# Patient Record
Sex: FEMALE | Race: WHITE | NOT HISPANIC OR LATINO | Employment: FULL TIME | URBAN - METROPOLITAN AREA
[De-identification: names, ages, dates, MRNs, and addresses within clinical notes are randomized per-mention and may not be internally consistent; named-entity substitution may affect disease eponyms.]

---

## 2020-07-07 ENCOUNTER — OFFICE VISIT (OUTPATIENT)
Dept: INTERNAL MEDICINE | Facility: CLINIC | Age: 35
End: 2020-07-07

## 2020-07-07 VITALS
BODY MASS INDEX: 28.79 KG/M2 | HEIGHT: 65 IN | DIASTOLIC BLOOD PRESSURE: 98 MMHG | TEMPERATURE: 98 F | WEIGHT: 172.8 LBS | SYSTOLIC BLOOD PRESSURE: 138 MMHG

## 2020-07-07 DIAGNOSIS — R03.0 ELEVATED BLOOD PRESSURE READING: ICD-10-CM

## 2020-07-07 DIAGNOSIS — Z00.00 WELLNESS EXAMINATION: Primary | ICD-10-CM

## 2020-07-07 DIAGNOSIS — G44.89 OTHER HEADACHE SYNDROME: ICD-10-CM

## 2020-07-07 DIAGNOSIS — Z11.59 NEED FOR HEPATITIS C SCREENING TEST: ICD-10-CM

## 2020-07-07 PROCEDURE — 99385 PREV VISIT NEW AGE 18-39: CPT | Performed by: INTERNAL MEDICINE

## 2020-07-07 NOTE — PROGRESS NOTES
Subjective        Chief Complaint   Patient presents with   • Annual Exam     est care           Adriane Schwartz is a 35 y.o. female who presents for    Patient Active Problem List   Diagnosis   • Wellness examination   • Other headache syndrome   • Elevated blood pressure reading       History of Present Illness     She is here to establish. She has occasional anxiety; she does not feel like she needs medications. She denies SI. Her headaches happen 1-2 times per month. They are usually frontal. She is not sure of the trigger. The pain can be worse with lights or if her dog barks. She uses an ice pack and it helps. She has tried ibuprofen and tylenol. She denies slurred speech, blindness or weakness. They do not wake her from sleep. They are not associated with her menstrual cycle. She has had headaches since college. She can have nausea with them. Her BP was elevated at the dentist about a month ago. She does not check it at home. She has not had any issues when she donates blood at the [employer]. She does not routinely add salt to her food. Her sister was started on BP meds in her early 30s. She does not exercise.  No Known Allergies    No current outpatient medications on file prior to visit.     No current facility-administered medications on file prior to visit.        Past Medical History:   Diagnosis Date   • Acid reflux    • Migraine headache        Past Surgical History:   Procedure Laterality Date   • FRACTURE SURGERY Left     Elbow       Family History   Problem Relation Age of Onset   • Hypertension Mother    • Cancer Father         sarcoma in neck   • Hypertension Sister    • Migraines Sister    • Heart attack Paternal Uncle    • Heart attack Maternal Grandfather        Social History     Socioeconomic History   • Marital status:      Spouse name: Not on file   • Number of children: Not on file   • Years of education: Not on file   • Highest education level: Not on file   Tobacco Use   • Smoking  "status: Never Smoker   • Smokeless tobacco: Never Used   Substance and Sexual Activity   • Alcohol use: Yes     Frequency: 4 or more times a week     Drinks per session: 1 or 2     Comment: 3 beers on weekend. glass of wine each weekend night           The following portions of the patient's history were reviewed and updated as appropriate: problem list, allergies, current medications, past medical history, past family history, past social history and past surgical history.    Review of Systems   Constitutional: Negative for chills, fever and unexpected weight loss.   HENT: Negative for postnasal drip and sore throat.    Eyes: Negative for blurred vision.   Respiratory: Negative for cough, shortness of breath and wheezing.    Cardiovascular: Negative for chest pain and leg swelling.   Gastrointestinal: Negative for abdominal pain, blood in stool, nausea, vomiting and GERD.   Endocrine: Negative for polyuria.   Genitourinary: Negative for dysuria, frequency and hematuria.   Musculoskeletal: Negative for gait problem.   Skin: Negative for rash.   Allergic/Immunologic: Negative for immunocompromised state.   Neurological: Positive for headache. Negative for weakness.   Hematological: Does not bruise/bleed easily.   Psychiatric/Behavioral: Negative for depressed mood. The patient is nervous/anxious.        There is no immunization history for the selected administration types on file for this patient.    Objective   Vitals:    07/07/20 0945   BP: 138/98   Temp: 98 °F (36.7 °C)   Weight: 78.4 kg (172 lb 12.8 oz)   Height: 165.1 cm (65\")     Body mass index is 28.76 kg/m².  Physical Exam   Constitutional: She appears well-developed and well-nourished.   HENT:   Head: Normocephalic and atraumatic.   Mouth/Throat: Oropharynx is clear and moist.   Eyes: Pupils are equal, round, and reactive to light. Conjunctivae and EOM are normal.   Neck: Neck supple. No thyromegaly present.   Cardiovascular: Normal rate, regular rhythm " and normal heart sounds.   No murmur heard.  Pulmonary/Chest: Effort normal and breath sounds normal.   Abdominal: Soft. She exhibits no distension and no mass. There is no tenderness. There is no rebound.   Lymphadenopathy:     She has no cervical adenopathy.   Neurological: She is alert.   Reflex Scores:       Bicep reflexes are 1+ on the right side and 1+ on the left side.       Brachioradialis reflexes are 1+ on the right side and 1+ on the left side.       Patellar reflexes are 3+ on the right side and 3+ on the left side.       Achilles reflexes are 2+ on the right side and 2+ on the left side.  CN II-XII intact except did not check VIII    5/5 strength    Skin: Skin is warm.   Psychiatric: She has a normal mood and affect.   Vitals reviewed.      Procedures    Assessment/Plan   Adriane was seen today for annual exam.    Diagnoses and all orders for this visit:    Wellness examination  -     Hepatitis C Antibody  -     Comprehensive Metabolic Panel    Other headache syndrome    Need for hepatitis C screening test  -     Lipid Panel With / Chol / HDL Ratio    Elevated blood pressure reading    Other orders  -     Cancel: Tdap Vaccine Greater Than or Equal To 6yo IM             Tdap in 6 weeks as we are out of them today.      She reports pap smear in [place] last December. Her  is sterile. Discussed exercise 150 minutes per week. Discussed avoiding salt. Decrease beer on the weekend. Encouraged to buy Omron BP monitor and check few times per week. I want to see if BP elevation correlates with headaches. If her BP is up next time, I would get an EKG and treat. If BP is normal at home, we could consider trial of Imitrex.  Return in about 6 weeks (around 8/18/2020).

## 2020-07-08 LAB
ALBUMIN SERPL-MCNC: 4.4 G/DL (ref 3.5–5.2)
ALBUMIN/GLOB SERPL: 1.8 G/DL
ALP SERPL-CCNC: 80 U/L (ref 39–117)
ALT SERPL-CCNC: 18 U/L (ref 1–33)
AST SERPL-CCNC: 16 U/L (ref 1–32)
BILIRUB SERPL-MCNC: 0.4 MG/DL (ref 0–1.2)
BUN SERPL-MCNC: 11 MG/DL (ref 6–20)
BUN/CREAT SERPL: 14.3 (ref 7–25)
CALCIUM SERPL-MCNC: 9.2 MG/DL (ref 8.6–10.5)
CHLORIDE SERPL-SCNC: 103 MMOL/L (ref 98–107)
CHOLEST SERPL-MCNC: 187 MG/DL (ref 0–200)
CHOLEST/HDLC SERPL: 2.92 {RATIO}
CO2 SERPL-SCNC: 25.4 MMOL/L (ref 22–29)
CREAT SERPL-MCNC: 0.77 MG/DL (ref 0.57–1)
GLOBULIN SER CALC-MCNC: 2.4 GM/DL
GLUCOSE SERPL-MCNC: 87 MG/DL (ref 65–99)
HCV AB S/CO SERPL IA: <0.1 S/CO RATIO (ref 0–0.9)
HDLC SERPL-MCNC: 64 MG/DL (ref 40–60)
LDLC SERPL CALC-MCNC: 105 MG/DL (ref 0–100)
POTASSIUM SERPL-SCNC: 4.3 MMOL/L (ref 3.5–5.2)
PROT SERPL-MCNC: 6.8 G/DL (ref 6–8.5)
SODIUM SERPL-SCNC: 137 MMOL/L (ref 136–145)
TRIGL SERPL-MCNC: 88 MG/DL (ref 0–150)
VLDLC SERPL CALC-MCNC: 17.6 MG/DL

## 2020-07-09 ENCOUNTER — TELEPHONE (OUTPATIENT)
Dept: INTERNAL MEDICINE | Facility: CLINIC | Age: 35
End: 2020-07-09

## 2020-08-20 ENCOUNTER — OFFICE VISIT (OUTPATIENT)
Dept: INTERNAL MEDICINE | Facility: CLINIC | Age: 35
End: 2020-08-20

## 2020-08-20 VITALS
HEIGHT: 65 IN | BODY MASS INDEX: 27.99 KG/M2 | WEIGHT: 168 LBS | TEMPERATURE: 97.5 F | SYSTOLIC BLOOD PRESSURE: 136 MMHG | DIASTOLIC BLOOD PRESSURE: 98 MMHG

## 2020-08-20 DIAGNOSIS — I10 ESSENTIAL HYPERTENSION: Primary | ICD-10-CM

## 2020-08-20 PROCEDURE — 90471 IMMUNIZATION ADMIN: CPT | Performed by: INTERNAL MEDICINE

## 2020-08-20 PROCEDURE — 99213 OFFICE O/P EST LOW 20 MIN: CPT | Performed by: INTERNAL MEDICINE

## 2020-08-20 PROCEDURE — 93000 ELECTROCARDIOGRAM COMPLETE: CPT | Performed by: INTERNAL MEDICINE

## 2020-08-20 PROCEDURE — 90715 TDAP VACCINE 7 YRS/> IM: CPT | Performed by: INTERNAL MEDICINE

## 2020-08-20 RX ORDER — AMLODIPINE BESYLATE 5 MG/1
5 TABLET ORAL DAILY
Qty: 30 TABLET | Refills: 2 | Status: SHIPPED | OUTPATIENT
Start: 2020-08-20 | End: 2020-09-21

## 2020-08-20 NOTE — PROGRESS NOTES
Subjective        Chief Complaint   Patient presents with   • Hypertension           Adriane Schwartz is a 35 y.o. female who presents for    Patient Active Problem List   Diagnosis   • Other headache syndrome   • Essential hypertension       History of Present Illness     Her BP has been 129-143/. She denies chest pain or dyspnea. She does not exercise. She can feel a fast heart rate after drinking caffeine. She has not had a migraine since I saw her.  No Known Allergies    No current outpatient medications on file prior to visit.     No current facility-administered medications on file prior to visit.        Past Medical History:   Diagnosis Date   • Acid reflux    • Migraine headache        Past Surgical History:   Procedure Laterality Date   • FRACTURE SURGERY Left     Elbow       Family History   Problem Relation Age of Onset   • Hypertension Mother    • Cancer Father         sarcoma in neck   • Hypertension Sister    • Migraines Sister    • Heart attack Paternal Uncle    • Heart attack Maternal Grandfather        Social History     Socioeconomic History   • Marital status:      Spouse name: Not on file   • Number of children: Not on file   • Years of education: Not on file   • Highest education level: Not on file   Tobacco Use   • Smoking status: Never Smoker   • Smokeless tobacco: Never Used   Substance and Sexual Activity   • Alcohol use: Yes     Frequency: 4 or more times a week     Drinks per session: 1 or 2     Comment: 3 beers on weekend. glass of wine each weekend night           The following portions of the patient's history were reviewed and updated as appropriate: problem list, allergies, current medications, past medical history, past family history, past social history and past surgical history.    Review of Systems   Respiratory: Negative for shortness of breath.    Cardiovascular: Negative for chest pain.       Immunization History   Administered Date(s) Administered   • Tdap 08/20/2020  "      Objective   Vitals:    08/20/20 1504   BP: 136/98   Temp: 97.5 °F (36.4 °C)   Weight: 76.2 kg (168 lb)   Height: 165.1 cm (65\")     Body mass index is 27.96 kg/m².  Physical Exam   Constitutional: She appears well-developed and well-nourished.   HENT:   Head: Normocephalic and atraumatic.   Cardiovascular: Normal rate, regular rhythm, S1 normal, S2 normal and normal heart sounds.   Pulmonary/Chest: Effort normal and breath sounds normal.   Neurological: She is alert.   Skin: Skin is warm.   Psychiatric: She has a normal mood and affect.   Vitals reviewed.        ECG 12 Lead  Date/Time: 8/20/2020 3:47 PM  Performed by: Harrison Lew MD  Authorized by: Harrison Lew MD   Comparison: not compared with previous ECG   Rhythm: sinus rhythm  Rate: normal  T elevation: V1  QRS axis: normal    Clinical impression: normal ECG            Assessment/Plan   Adriane was seen today for hypertension.    Diagnoses and all orders for this visit:    Essential hypertension  -     amLODIPine (NORVASC) 5 MG tablet; Take 1 tablet by mouth Daily.  -     ECG 12 Lead    Other orders  -     Tdap Vaccine Greater Than or Equal To 8yo IM             EKG is nl. Tdap today. Start Norvasc for HTN. Discussed exercise. Reviewed labs.    Return in about 4 weeks (around 9/17/2020).  "

## 2020-09-21 ENCOUNTER — OFFICE VISIT (OUTPATIENT)
Dept: INTERNAL MEDICINE | Facility: CLINIC | Age: 35
End: 2020-09-21

## 2020-09-21 VITALS
TEMPERATURE: 98.4 F | DIASTOLIC BLOOD PRESSURE: 90 MMHG | BODY MASS INDEX: 28.19 KG/M2 | WEIGHT: 169.2 LBS | HEIGHT: 65 IN | SYSTOLIC BLOOD PRESSURE: 120 MMHG

## 2020-09-21 DIAGNOSIS — I10 ESSENTIAL HYPERTENSION: Primary | ICD-10-CM

## 2020-09-21 PROCEDURE — 90471 IMMUNIZATION ADMIN: CPT | Performed by: INTERNAL MEDICINE

## 2020-09-21 PROCEDURE — 90686 IIV4 VACC NO PRSV 0.5 ML IM: CPT | Performed by: INTERNAL MEDICINE

## 2020-09-21 PROCEDURE — 99213 OFFICE O/P EST LOW 20 MIN: CPT | Performed by: INTERNAL MEDICINE

## 2020-09-21 RX ORDER — AMLODIPINE BESYLATE 10 MG/1
10 TABLET ORAL DAILY
Qty: 30 TABLET | Refills: 2 | Status: SHIPPED | OUTPATIENT
Start: 2020-09-21 | End: 2020-11-03 | Stop reason: SDUPTHER

## 2020-09-21 NOTE — PROGRESS NOTES
Subjective        Chief Complaint   Patient presents with   • Hypertension           Adriane Schwartz is a 35 y.o. female who presents for    Patient Active Problem List   Diagnosis   • Other headache syndrome   • Essential hypertension       History of Present Illness     She thinks her BP is coming down but she did not bring in those numbers today. She thinks it was 120/90 earlier today. She denies chest pain or dyspnea. She has not had any headaches recently.  No Known Allergies    Current Outpatient Medications on File Prior to Visit   Medication Sig Dispense Refill   • [DISCONTINUED] amLODIPine (NORVASC) 5 MG tablet Take 1 tablet by mouth Daily. 30 tablet 2     No current facility-administered medications on file prior to visit.        Past Medical History:   Diagnosis Date   • Acid reflux    • Migraine headache        Past Surgical History:   Procedure Laterality Date   • FRACTURE SURGERY Left     Elbow       Family History   Problem Relation Age of Onset   • Hypertension Mother    • Cancer Father         sarcoma in neck   • Hypertension Sister    • Migraines Sister    • Heart attack Paternal Uncle    • Heart attack Maternal Grandfather        Social History     Socioeconomic History   • Marital status:      Spouse name: Not on file   • Number of children: Not on file   • Years of education: Not on file   • Highest education level: Not on file   Tobacco Use   • Smoking status: Never Smoker   • Smokeless tobacco: Never Used   Substance and Sexual Activity   • Alcohol use: Yes     Frequency: 4 or more times a week     Drinks per session: 1 or 2     Comment: 3 beers on weekend. glass of wine each weekend night           The following portions of the patient's history were reviewed and updated as appropriate: problem list, allergies, current medications, past medical history, past family history, past social history and past surgical history.    Review of Systems   Respiratory: Negative for shortness of breath.  "   Cardiovascular: Negative for chest pain.       Immunization History   Administered Date(s) Administered   • Flulaval/Fluarix Quad 09/21/2020   • Tdap 08/20/2020       Objective   Vitals:    09/21/20 1433   BP: 120/90   Temp: 98.4 °F (36.9 °C)   Weight: 76.7 kg (169 lb 3.2 oz)   Height: 165.1 cm (65\")     Body mass index is 28.16 kg/m².  Physical Exam  Vitals signs reviewed.   Constitutional:       Appearance: She is well-developed.   HENT:      Head: Normocephalic and atraumatic.   Cardiovascular:      Rate and Rhythm: Normal rate and regular rhythm.      Heart sounds: Normal heart sounds, S1 normal and S2 normal.   Pulmonary:      Effort: Pulmonary effort is normal.      Breath sounds: Normal breath sounds.   Skin:     General: Skin is warm.   Neurological:      Mental Status: She is alert.   Psychiatric:         Behavior: Behavior normal.         Procedures    Assessment/Plan   Adriane was seen today for hypertension.    Diagnoses and all orders for this visit:    Essential hypertension  -     amLODIPine (NORVASC) 10 MG tablet; Take 1 tablet by mouth Daily.    Other orders  -     Fluarix/Fluzone/Afluria Quad>6 Months               Flu shot today. Increase Norvasc to 10 mg daily since DBP still up at home as well. Monitor for dizziness and edema.  Return in about 5 weeks (around 10/26/2020).  "

## 2020-11-03 ENCOUNTER — OFFICE VISIT (OUTPATIENT)
Dept: INTERNAL MEDICINE | Facility: CLINIC | Age: 35
End: 2020-11-03

## 2020-11-03 VITALS
TEMPERATURE: 97.5 F | SYSTOLIC BLOOD PRESSURE: 116 MMHG | HEIGHT: 65 IN | WEIGHT: 163 LBS | DIASTOLIC BLOOD PRESSURE: 82 MMHG | BODY MASS INDEX: 27.16 KG/M2

## 2020-11-03 DIAGNOSIS — R63.0 DECREASED APPETITE: Primary | ICD-10-CM

## 2020-11-03 DIAGNOSIS — I10 ESSENTIAL HYPERTENSION: ICD-10-CM

## 2020-11-03 PROCEDURE — 99213 OFFICE O/P EST LOW 20 MIN: CPT | Performed by: INTERNAL MEDICINE

## 2020-11-03 RX ORDER — AMLODIPINE BESYLATE 10 MG/1
10 TABLET ORAL DAILY
Qty: 90 TABLET | Refills: 1 | Status: SHIPPED | OUTPATIENT
Start: 2020-11-03 | End: 2021-05-17 | Stop reason: SDUPTHER

## 2020-11-03 NOTE — PROGRESS NOTES
Subjective        Chief Complaint   Patient presents with   • Hypertension           Adriane Schwartz is a 35 y.o. female who presents for    Patient Active Problem List   Diagnosis   • Other headache syndrome   • Essential hypertension   • Decreased appetite       History of Present Illness     Her BP has been 120/80. She denies dyspnea, chest pain or headaches. She is not exercising. She thinks her appetite is not as much with Norvasc. She denies nausea, vomiting, abdominal pain or melena.   No Known Allergies    Current Outpatient Medications on File Prior to Visit   Medication Sig Dispense Refill   • [DISCONTINUED] amLODIPine (NORVASC) 10 MG tablet Take 1 tablet by mouth Daily. 30 tablet 2     No current facility-administered medications on file prior to visit.        Past Medical History:   Diagnosis Date   • Acid reflux    • Migraine headache        Past Surgical History:   Procedure Laterality Date   • FRACTURE SURGERY Left     Elbow       Family History   Problem Relation Age of Onset   • Hypertension Mother    • Cancer Father         sarcoma in neck   • Hypertension Sister    • Migraines Sister    • Heart attack Paternal Uncle    • Heart attack Maternal Grandfather        Social History     Socioeconomic History   • Marital status:      Spouse name: Not on file   • Number of children: Not on file   • Years of education: Not on file   • Highest education level: Not on file   Tobacco Use   • Smoking status: Never Smoker   • Smokeless tobacco: Never Used   Substance and Sexual Activity   • Alcohol use: Yes     Frequency: 4 or more times a week     Drinks per session: 1 or 2     Comment: 3 beers on weekend. glass of wine each weekend night           The following portions of the patient's history were reviewed and updated as appropriate: problem list, allergies, current medications, past medical history, past family history, past social history and past surgical history.    Review of Systems   Respiratory:  "Negative for shortness of breath.    Cardiovascular: Negative for chest pain.   Neurological: Negative for headache.       Immunization History   Administered Date(s) Administered   • Flulaval/Fluarix/Fluzone Quad 09/21/2020   • Tdap 08/20/2020       Objective   Vitals:    11/03/20 1648   BP: 116/82   Temp: 97.5 °F (36.4 °C)   Weight: 73.9 kg (163 lb)   Height: 165.1 cm (65\")     Body mass index is 27.12 kg/m².  Physical Exam  Vitals signs reviewed.   Constitutional:       Appearance: She is well-developed.   HENT:      Head: Normocephalic and atraumatic.   Cardiovascular:      Rate and Rhythm: Normal rate and regular rhythm.      Heart sounds: Normal heart sounds, S1 normal and S2 normal.   Pulmonary:      Effort: Pulmonary effort is normal.      Breath sounds: Normal breath sounds.   Abdominal:      Palpations: Abdomen is soft. There is no mass.      Tenderness: There is no abdominal tenderness.   Skin:     General: Skin is warm.   Neurological:      Mental Status: She is alert.   Psychiatric:         Behavior: Behavior normal.         Procedures    Assessment/Plan   Diagnoses and all orders for this visit:    1. Decreased appetite (Primary)    2. Essential hypertension  -     amLODIPine (NORVASC) 10 MG tablet; Take 1 tablet by mouth Daily.  Dispense: 90 tablet; Refill: 1             BP is excellent. Her appetite has decreased some.  We discussed not likely related to Norvasc. Offered to do more testing. She will come back in if she were to develop any other symptoms. I think that is reasonable given that she feels good otherwise.    Return in about 6 months (around 5/3/2021).  "

## 2021-04-16 ENCOUNTER — BULK ORDERING (OUTPATIENT)
Dept: CASE MANAGEMENT | Facility: OTHER | Age: 36
End: 2021-04-16

## 2021-04-16 DIAGNOSIS — Z23 IMMUNIZATION DUE: ICD-10-CM

## 2021-05-13 ENCOUNTER — OFFICE VISIT (OUTPATIENT)
Dept: INTERNAL MEDICINE | Facility: CLINIC | Age: 36
End: 2021-05-13

## 2021-05-13 VITALS
WEIGHT: 146 LBS | SYSTOLIC BLOOD PRESSURE: 118 MMHG | BODY MASS INDEX: 24.32 KG/M2 | DIASTOLIC BLOOD PRESSURE: 88 MMHG | TEMPERATURE: 97.6 F | HEIGHT: 65 IN

## 2021-05-13 DIAGNOSIS — R63.4 WEIGHT LOSS: ICD-10-CM

## 2021-05-13 DIAGNOSIS — I10 ESSENTIAL HYPERTENSION: Primary | ICD-10-CM

## 2021-05-13 PROCEDURE — 99214 OFFICE O/P EST MOD 30 MIN: CPT | Performed by: INTERNAL MEDICINE

## 2021-05-13 NOTE — PROGRESS NOTES
Subjective        Chief Complaint   Patient presents with   • Hypertension     f/u           Adriane Schwartz is a 36 y.o. female who presents for    Patient Active Problem List   Diagnosis   • Other headache syndrome   • Essential hypertension   • Decreased appetite       History of Present Illness     She has not checked her BP recently. She denies chest pain or dyspnea. She has lost 17 pounds. She says her weight has been stable for the last  3 months. She is not exercising. She does not seem to be as hungry so she does not over eat. She denies depression, melena, hematochezia, cough, lymphadenopathy, abnormal moles, nausea, vomiting, or abdominal pain. She thinks she does not go out to eat as often. She does not feel hungry at home. She thinks she may have been missing lunch more since she has worked from home.  No Known Allergies    Current Outpatient Medications on File Prior to Visit   Medication Sig Dispense Refill   • amLODIPine (NORVASC) 10 MG tablet Take 1 tablet by mouth Daily. 90 tablet 1     No current facility-administered medications on file prior to visit.       Past Medical History:   Diagnosis Date   • Acid reflux    • Migraine headache        Past Surgical History:   Procedure Laterality Date   • FRACTURE SURGERY Left     Elbow       Family History   Problem Relation Age of Onset   • Hypertension Mother    • Cancer Father         sarcoma in neck   • Hypertension Sister    • Migraines Sister    • Heart attack Paternal Uncle    • Heart attack Maternal Grandfather        Social History     Socioeconomic History   • Marital status:      Spouse name: Not on file   • Number of children: Not on file   • Years of education: Not on file   • Highest education level: Not on file   Tobacco Use   • Smoking status: Never Smoker   • Smokeless tobacco: Never Used   Substance and Sexual Activity   • Alcohol use: Yes     Comment: 3 beers on weekend. glass of wine each weekend night           The following  "portions of the patient's history were reviewed and updated as appropriate: problem list, allergies, current medications, past medical history, past family history, past social history and past surgical history.    Review of Systems    Immunization History   Administered Date(s) Administered   • COVID-19 (PFIZER) 03/11/2021, 04/03/2021   • Flulaval/Fluarix/Fluzone Quad 09/21/2020   • Tdap 08/20/2020       Objective   Vitals:    05/13/21 1646   BP: 118/88   Temp: 97.6 °F (36.4 °C)   Weight: 66.2 kg (146 lb)   Height: 165.1 cm (65\")     Body mass index is 24.3 kg/m².  Physical Exam  Vitals reviewed.   Constitutional:       Appearance: She is well-developed.   HENT:      Head: Normocephalic and atraumatic.      Mouth/Throat:      Mouth: Mucous membranes are moist.      Pharynx: Oropharynx is clear.   Cardiovascular:      Rate and Rhythm: Normal rate and regular rhythm.      Heart sounds: Normal heart sounds, S1 normal and S2 normal.   Pulmonary:      Effort: Pulmonary effort is normal.      Breath sounds: Normal breath sounds.   Abdominal:      Palpations: Abdomen is soft. There is no mass.      Tenderness: There is no abdominal tenderness.   Lymphadenopathy:      Cervical: No cervical adenopathy.      Upper Body:      Right upper body: No supraclavicular, axillary or epitrochlear adenopathy.      Left upper body: No supraclavicular, axillary or epitrochlear adenopathy.   Skin:     General: Skin is warm.   Neurological:      Mental Status: She is alert.   Psychiatric:         Behavior: Behavior normal.         Procedures    Assessment/Plan   Diagnoses and all orders for this visit:    1. Essential hypertension (Primary)  -     Comprehensive Metabolic Panel; Future  -     Lipid Panel With / Chol / HDL Ratio; Future    2. Weight loss  -     CBC & Differential; Future  -     TSH; Future  -     Comprehensive Metabolic Panel; Future  -     Urinalysis With Culture If Indicated -; Future               BP is okay. She has " stress some today with putting house on the market. It sounds like weight loss may be related to change in routine with working from home. She says it has been stable the last 3 months and she does not feel bad. To be complete, we will get some blood work in the next week. Her cancer screening is UTD with a negative pap smear in the last 18-24 months.  Return in about 10 weeks (around 7/22/2021), or labs end next week as well, for [facility] Before [facility], Annual physical.

## 2021-05-17 DIAGNOSIS — I10 ESSENTIAL HYPERTENSION: ICD-10-CM

## 2021-05-17 RX ORDER — AMLODIPINE BESYLATE 10 MG/1
10 TABLET ORAL DAILY
Qty: 90 TABLET | Refills: 1 | Status: SHIPPED | OUTPATIENT
Start: 2021-05-17 | End: 2021-09-21 | Stop reason: SDUPTHER

## 2021-05-31 DIAGNOSIS — R63.4 WEIGHT LOSS: Primary | ICD-10-CM

## 2021-06-01 ENCOUNTER — TELEPHONE (OUTPATIENT)
Dept: INTERNAL MEDICINE | Facility: CLINIC | Age: 36
End: 2021-06-01

## 2021-09-21 ENCOUNTER — OFFICE VISIT (OUTPATIENT)
Dept: INTERNAL MEDICINE | Facility: CLINIC | Age: 36
End: 2021-09-21

## 2021-09-21 VITALS
TEMPERATURE: 97.8 F | HEIGHT: 65 IN | SYSTOLIC BLOOD PRESSURE: 112 MMHG | DIASTOLIC BLOOD PRESSURE: 82 MMHG | BODY MASS INDEX: 23.82 KG/M2 | WEIGHT: 143 LBS

## 2021-09-21 DIAGNOSIS — F51.01 PRIMARY INSOMNIA: ICD-10-CM

## 2021-09-21 DIAGNOSIS — Z00.00 WELLNESS EXAMINATION: ICD-10-CM

## 2021-09-21 DIAGNOSIS — Z23 NEEDS FLU SHOT: Primary | ICD-10-CM

## 2021-09-21 DIAGNOSIS — F41.9 ANXIETY: ICD-10-CM

## 2021-09-21 DIAGNOSIS — I10 ESSENTIAL HYPERTENSION: ICD-10-CM

## 2021-09-21 PROCEDURE — 90471 IMMUNIZATION ADMIN: CPT | Performed by: INTERNAL MEDICINE

## 2021-09-21 PROCEDURE — 99395 PREV VISIT EST AGE 18-39: CPT | Performed by: INTERNAL MEDICINE

## 2021-09-21 PROCEDURE — 90686 IIV4 VACC NO PRSV 0.5 ML IM: CPT | Performed by: INTERNAL MEDICINE

## 2021-09-21 RX ORDER — TRAZODONE HYDROCHLORIDE 50 MG/1
50 TABLET ORAL NIGHTLY
Qty: 30 TABLET | Refills: 3 | Status: SHIPPED | OUTPATIENT
Start: 2021-09-21

## 2021-09-21 RX ORDER — AMLODIPINE BESYLATE 10 MG/1
10 TABLET ORAL DAILY
Qty: 90 TABLET | Refills: 1 | Status: SHIPPED | OUTPATIENT
Start: 2021-09-21 | End: 2022-06-14 | Stop reason: SDUPTHER

## 2021-09-21 NOTE — PROGRESS NOTES
Subjective        Chief Complaint   Patient presents with   • Annual Exam           Adriane Schwartz is a 36 y.o. female who presents for    Patient Active Problem List   Diagnosis   • Other headache syndrome   • Essential hypertension   • Decreased appetite       History of Present Illness     She has not been checking her BP. Her anxiety has been getting worse over the last 2 months. She is not as focused. She has not been sleeping well. It is hard to go to sleep and stay asleep. She has never had anxiety before. Things are fine at work and home. Her  was told that he is sterile.  Her appetite fluctuates. Her weight has been stable at home. She denies depression. She has tried melatonin for sleep and it tenses her legs sometimes.  No Known Allergies    Current Outpatient Medications on File Prior to Visit   Medication Sig Dispense Refill   • [DISCONTINUED] amLODIPine (NORVASC) 10 MG tablet Take 1 tablet by mouth Daily. 90 tablet 1     No current facility-administered medications on file prior to visit.       Past Medical History:   Diagnosis Date   • Acid reflux    • Migraine headache        Past Surgical History:   Procedure Laterality Date   • FRACTURE SURGERY Left     Elbow       Family History   Problem Relation Age of Onset   • Hypertension Mother    • Cancer Father         sarcoma in neck   • Hypertension Sister    • Migraines Sister    • Anxiety disorder Sister    • Heart attack Paternal Uncle    • Heart attack Maternal Grandfather        Social History     Socioeconomic History   • Marital status:      Spouse name: Not on file   • Number of children: Not on file   • Years of education: Not on file   • Highest education level: Not on file   Tobacco Use   • Smoking status: Never Smoker   • Smokeless tobacco: Never Used   Substance and Sexual Activity   • Alcohol use: Yes     Comment: 3 beers on weekend. glass of wine each weekend night           The following portions of the patient's history were  "reviewed and updated as appropriate: problem list, allergies, current medications, past medical history, past family history, past social history and past surgical history.    Review of Systems   Constitutional: Negative for chills, fever and unexpected weight loss.   HENT: Negative for postnasal drip and sore throat.    Eyes: Negative for blurred vision.   Respiratory: Negative for cough, shortness of breath and wheezing.    Cardiovascular: Negative for chest pain and leg swelling.   Gastrointestinal: Negative for abdominal pain, blood in stool, nausea, vomiting and GERD.   Endocrine: Negative for polyuria.   Genitourinary: Negative for dysuria, frequency and hematuria.   Musculoskeletal: Negative for gait problem.   Skin: Negative for rash.   Allergic/Immunologic: Negative for immunocompromised state.   Neurological: Negative for weakness.   Hematological: Does not bruise/bleed easily.   Psychiatric/Behavioral: Negative for depressed mood. The patient is nervous/anxious.        Immunization History   Administered Date(s) Administered   • COVID-19 (PFIZER) 03/11/2021, 04/03/2021   • FluLaval/Fluarix (VFC) >6 Months 09/21/2020, 09/21/2021   • Tdap 08/20/2020       Objective   Vitals:    09/21/21 1618   BP: 112/82   Temp: 97.8 °F (36.6 °C)   Weight: 64.9 kg (143 lb)   Height: 165.1 cm (65\")     Body mass index is 23.8 kg/m².  Physical Exam  Vitals reviewed.   Constitutional:       Appearance: She is well-developed.   HENT:      Head: Normocephalic and atraumatic.      Mouth/Throat:      Mouth: Mucous membranes are moist.      Pharynx: Oropharynx is clear.   Eyes:      Extraocular Movements: Extraocular movements intact.      Conjunctiva/sclera: Conjunctivae normal.      Pupils: Pupils are equal, round, and reactive to light.   Neck:      Thyroid: No thyromegaly.   Cardiovascular:      Rate and Rhythm: Normal rate and regular rhythm.      Heart sounds: Normal heart sounds. No murmur heard.     Pulmonary:      Effort: " Pulmonary effort is normal.      Breath sounds: Normal breath sounds.   Abdominal:      General: There is no distension.      Palpations: Abdomen is soft. There is no mass.      Tenderness: There is no abdominal tenderness. There is no rebound.   Musculoskeletal:      Cervical back: Neck supple.   Lymphadenopathy:      Cervical: No cervical adenopathy.   Skin:     General: Skin is warm.   Neurological:      Mental Status: She is alert.   Psychiatric:         Behavior: Behavior normal.         Procedures    Assessment/Plan   Diagnoses and all orders for this visit:    1. Needs flu shot (Primary)  -     FluLaval/Fluarix >6 Months ([id number])    2. Wellness examination    3. Anxiety  Comments:  Discussed SSRI. We will treat insomnia first to see if it helps.    4. Primary insomnia  Comments:  Trial Trazodone  Orders:  -     traZODone (DESYREL) 50 MG tablet; Take 1 tablet by mouth Every Night.  Dispense: 30 tablet; Refill: 3    5. Essential hypertension  -     amLODIPine (NORVASC) 10 MG tablet; Take 1 tablet by mouth Daily.  Dispense: 90 tablet; Refill: 1               Flu shot today. Reviewed labs. Discussed exercising 150 minutes per week. BP is good.  Return in about 6 months (around 3/21/2022).

## 2022-04-08 ENCOUNTER — OFFICE VISIT (OUTPATIENT)
Dept: INTERNAL MEDICINE | Facility: CLINIC | Age: 37
End: 2022-04-08

## 2022-04-08 VITALS
WEIGHT: 151.8 LBS | HEIGHT: 65 IN | BODY MASS INDEX: 25.29 KG/M2 | DIASTOLIC BLOOD PRESSURE: 80 MMHG | SYSTOLIC BLOOD PRESSURE: 122 MMHG

## 2022-04-08 DIAGNOSIS — T78.40XA ALLERGY, INITIAL ENCOUNTER: ICD-10-CM

## 2022-04-08 DIAGNOSIS — I10 ESSENTIAL HYPERTENSION: Chronic | ICD-10-CM

## 2022-04-08 DIAGNOSIS — R82.90 ABNORMAL FINDING ON URINALYSIS: Primary | ICD-10-CM

## 2022-04-08 PROCEDURE — 99214 OFFICE O/P EST MOD 30 MIN: CPT | Performed by: INTERNAL MEDICINE

## 2022-04-08 RX ORDER — MONTELUKAST SODIUM 10 MG/1
10 TABLET ORAL DAILY
Qty: 30 TABLET | Refills: 3 | Status: SHIPPED | OUTPATIENT
Start: 2022-04-08 | End: 2022-08-22 | Stop reason: SDUPTHER

## 2022-04-08 NOTE — PROGRESS NOTES
Subjective        Chief Complaint   Patient presents with   • Hypertension           Adriane Schwartz is a 36 y.o. female who presents for    Patient Active Problem List   Diagnosis   • Other headache syndrome   • Essential hypertension   • Decreased appetite       History of Present Illness     She has not been checking her BP. She uses trazodone prn. She has a runny nose, congestion, and itchy eyes. She uses Zyrtec; she still has  symptoms with it. She does not like nasal sprays. Her appetite is better. She has not seen any blood in her urine. She has itched most brenner. It improves over the spring and summer.  No Known Allergies    Current Outpatient Medications on File Prior to Visit   Medication Sig Dispense Refill   • amLODIPine (NORVASC) 10 MG tablet Take 1 tablet by mouth Daily. 90 tablet 1   • traZODone (DESYREL) 50 MG tablet Take 1 tablet by mouth Every Night. 30 tablet 3     No current facility-administered medications on file prior to visit.       Past Medical History:   Diagnosis Date   • Acid reflux    • Migraine headache        Past Surgical History:   Procedure Laterality Date   • FRACTURE SURGERY Left     Elbow       Family History   Problem Relation Age of Onset   • Hypertension Mother    • Cancer Father         sarcoma in neck   • Hypertension Sister    • Migraines Sister    • Anxiety disorder Sister    • Heart attack Paternal Uncle    • Heart attack Maternal Grandfather        Social History     Socioeconomic History   • Marital status:    Tobacco Use   • Smoking status: Never Smoker   • Smokeless tobacco: Never Used   Substance and Sexual Activity   • Alcohol use: Yes     Comment: 3 beers on weekend. glass of wine each weekend night           The following portions of the patient's history were reviewed and updated as appropriate: problem list, allergies, current medications, past medical history, past family history, past social history and past surgical history.    Review of Systems  "  Constitutional: Negative for chills, fever and unexpected weight loss.   HENT: Negative for postnasal drip and sore throat.    Eyes: Negative for blurred vision.   Respiratory: Negative for cough, shortness of breath and wheezing.    Cardiovascular: Negative for chest pain and leg swelling.   Gastrointestinal: Negative for abdominal pain, blood in stool, nausea, vomiting and GERD.   Endocrine: Negative for polyuria.   Genitourinary: Negative for dysuria, frequency and hematuria.   Musculoskeletal: Negative for gait problem.   Skin: Negative for rash.   Allergic/Immunologic: Positive for environmental allergies. Negative for immunocompromised state.   Neurological: Negative for weakness.   Hematological: Does not bruise/bleed easily.   Psychiatric/Behavioral: Negative for depressed mood. The patient is not nervous/anxious.        Immunization History   Administered Date(s) Administered   • COVID-19 (PFIZER) PURPLE CAP 03/11/2021, 04/03/2021, 12/08/2021   • FluLaval/Fluarix/Fluzone >6 09/21/2020, 09/21/2021   • Tdap 08/20/2020       Objective   Vitals:    04/08/22 0948   BP: 122/80   Weight: 68.9 kg (151 lb 12.8 oz)   Height: 165.1 cm (65\")     Body mass index is 25.26 kg/m².  Physical Exam  Vitals reviewed.   Constitutional:       Appearance: She is well-developed.   HENT:      Head: Normocephalic and atraumatic.      Mouth/Throat:      Mouth: Mucous membranes are moist.      Pharynx: Oropharynx is clear.   Eyes:      Extraocular Movements: Extraocular movements intact.      Conjunctiva/sclera: Conjunctivae normal.      Pupils: Pupils are equal, round, and reactive to light.   Neck:      Thyroid: No thyromegaly.   Cardiovascular:      Rate and Rhythm: Normal rate and regular rhythm.      Heart sounds: Normal heart sounds. No murmur heard.  Pulmonary:      Effort: Pulmonary effort is normal.      Breath sounds: Normal breath sounds.   Abdominal:      General: There is no distension.      Palpations: Abdomen is soft. " There is no mass.      Tenderness: There is no abdominal tenderness. There is no rebound.   Musculoskeletal:      Cervical back: Neck supple.   Lymphadenopathy:      Cervical: No cervical adenopathy.   Skin:     General: Skin is warm.   Neurological:      Mental Status: She is alert.   Psychiatric:         Behavior: Behavior normal.         Procedures    Assessment/Plan   Diagnoses and all orders for this visit:    1. Abnormal finding on urinalysis (Primary)  -     Urinalysis With Culture If Indicated -    2. Allergy, initial encounter  Comments:  Encouraged to try Flonase  Orders:  -     montelukast (Singulair) 10 MG tablet; Take 1 tablet by mouth Daily.  Dispense: 30 tablet; Refill: 3    3. Essential hypertension  -     Comprehensive Metabolic Panel; Future  -     Lipid Panel With / Chol / HDL Ratio; Future  -     TSH; Future  -     CBC & Differential; Future             BP is good. Repeat UA since had blood in urine but was near her cycle when collected in May 2021.    Return in about 6 months (around 10/8/2022) for Annual physical, Lab Before FUP.  Answers for HPI/ROS submitted by the patient on 4/6/2022  What is the primary reason for your visit?: High Blood Pressure

## 2022-04-26 LAB
APPEARANCE UR: CLEAR
BACTERIA #/AREA URNS HPF: ABNORMAL /[HPF]
BACTERIA UR CULT: NORMAL
BACTERIA UR CULT: NORMAL
BILIRUB UR QL STRIP: NEGATIVE
CASTS URNS QL MICRO: ABNORMAL /LPF
COLOR UR: YELLOW
EPI CELLS #/AREA URNS HPF: ABNORMAL /HPF (ref 0–10)
GLUCOSE UR QL STRIP: NEGATIVE
HGB UR QL STRIP: NEGATIVE
KETONES UR QL STRIP: ABNORMAL
LEUKOCYTE ESTERASE UR QL STRIP: NEGATIVE
MICRO URNS: ABNORMAL
MICRO URNS: ABNORMAL
NITRITE UR QL STRIP: NEGATIVE
PH UR STRIP: 6 [PH] (ref 5–7.5)
PROT UR QL STRIP: NEGATIVE
RBC #/AREA URNS HPF: ABNORMAL /HPF (ref 0–2)
SP GR UR STRIP: 1.02 (ref 1–1.03)
URINALYSIS REFLEX: ABNORMAL
UROBILINOGEN UR STRIP-MCNC: 0.2 MG/DL (ref 0.2–1)
WBC #/AREA URNS HPF: ABNORMAL /HPF (ref 0–5)

## 2022-06-14 DIAGNOSIS — I10 ESSENTIAL HYPERTENSION: ICD-10-CM

## 2022-06-14 RX ORDER — AMLODIPINE BESYLATE 10 MG/1
10 TABLET ORAL DAILY
Qty: 90 TABLET | Refills: 1 | Status: SHIPPED | OUTPATIENT
Start: 2022-06-14 | End: 2022-06-15 | Stop reason: SDUPTHER

## 2022-06-15 DIAGNOSIS — I10 ESSENTIAL HYPERTENSION: ICD-10-CM

## 2022-06-15 RX ORDER — AMLODIPINE BESYLATE 10 MG/1
10 TABLET ORAL DAILY
Qty: 90 TABLET | Refills: 1 | Status: SHIPPED | OUTPATIENT
Start: 2022-06-15 | End: 2022-10-14 | Stop reason: SDUPTHER

## 2022-08-22 DIAGNOSIS — T78.40XA ALLERGY, INITIAL ENCOUNTER: ICD-10-CM

## 2022-08-22 RX ORDER — MONTELUKAST SODIUM 10 MG/1
10 TABLET ORAL DAILY
Qty: 30 TABLET | Refills: 3 | Status: SHIPPED | OUTPATIENT
Start: 2022-08-22 | End: 2022-10-14 | Stop reason: SDUPTHER

## 2022-10-14 ENCOUNTER — OFFICE VISIT (OUTPATIENT)
Dept: INTERNAL MEDICINE | Facility: CLINIC | Age: 37
End: 2022-10-14

## 2022-10-14 VITALS
DIASTOLIC BLOOD PRESSURE: 86 MMHG | SYSTOLIC BLOOD PRESSURE: 128 MMHG | HEIGHT: 65 IN | BODY MASS INDEX: 24.32 KG/M2 | TEMPERATURE: 98 F | WEIGHT: 146 LBS

## 2022-10-14 DIAGNOSIS — T78.40XA ALLERGY, INITIAL ENCOUNTER: ICD-10-CM

## 2022-10-14 DIAGNOSIS — N39.0 ACUTE UTI: ICD-10-CM

## 2022-10-14 DIAGNOSIS — Z00.00 WELLNESS EXAMINATION: ICD-10-CM

## 2022-10-14 DIAGNOSIS — I10 ESSENTIAL HYPERTENSION: Chronic | ICD-10-CM

## 2022-10-14 DIAGNOSIS — R35.0 URINARY FREQUENCY: Primary | ICD-10-CM

## 2022-10-14 LAB
BILIRUB BLD-MCNC: ABNORMAL MG/DL
CLARITY, POC: CLEAR
COLOR UR: YELLOW
EXPIRATION DATE: ABNORMAL
GLUCOSE UR STRIP-MCNC: NEGATIVE MG/DL
KETONES UR QL: NEGATIVE
LEUKOCYTE EST, POC: ABNORMAL
Lab: ABNORMAL
NITRITE UR-MCNC: NEGATIVE MG/ML
PH UR: 5.5 [PH] (ref 5–8)
PROT UR STRIP-MCNC: ABNORMAL MG/DL
RBC # UR STRIP: ABNORMAL /UL
SP GR UR: 1.03 (ref 1–1.03)
UROBILINOGEN UR QL: ABNORMAL

## 2022-10-14 PROCEDURE — 99395 PREV VISIT EST AGE 18-39: CPT | Performed by: INTERNAL MEDICINE

## 2022-10-14 PROCEDURE — 99213 OFFICE O/P EST LOW 20 MIN: CPT | Performed by: INTERNAL MEDICINE

## 2022-10-14 PROCEDURE — 81003 URINALYSIS AUTO W/O SCOPE: CPT | Performed by: INTERNAL MEDICINE

## 2022-10-14 RX ORDER — AMLODIPINE BESYLATE 10 MG/1
10 TABLET ORAL DAILY
Qty: 90 TABLET | Refills: 1 | Status: SHIPPED | OUTPATIENT
Start: 2022-10-14

## 2022-10-14 RX ORDER — NITROFURANTOIN 25; 75 MG/1; MG/1
100 CAPSULE ORAL 2 TIMES DAILY
Qty: 10 CAPSULE | Refills: 0 | Status: SHIPPED | OUTPATIENT
Start: 2022-10-14

## 2022-10-14 RX ORDER — MONTELUKAST SODIUM 10 MG/1
10 TABLET ORAL DAILY
Qty: 90 TABLET | Refills: 3 | Status: SHIPPED | OUTPATIENT
Start: 2022-10-14

## 2022-10-14 NOTE — PROGRESS NOTES
Subjective        Chief Complaint   Patient presents with   • Annual Exam           Adriane Schwartz is a 37 y.o. female who presents for    Patient Active Problem List   Diagnosis   • Other headache syndrome   • Essential hypertension       History of Present Illness     She has had urgency and dysuria for 4 days. She has been using OTC UTI pain med from . She denies fever, chills, nausea or vomiting.  She has had more etoh over the weekends going to music festivals. She denies chest pain or dyspnea. She has not been checking her BP. Her  is sterile.  No Known Allergies    Current Outpatient Medications on File Prior to Visit   Medication Sig Dispense Refill   • traZODone (DESYREL) 50 MG tablet Take 1 tablet by mouth Every Night. 30 tablet 3   • [DISCONTINUED] amLODIPine (NORVASC) 10 MG tablet Take 1 tablet by mouth Daily. 90 tablet 1   • [DISCONTINUED] montelukast (Singulair) 10 MG tablet Take 1 tablet by mouth Daily. 30 tablet 3     No current facility-administered medications on file prior to visit.       Past Medical History:   Diagnosis Date   • Acid reflux    • Migraine headache        Past Surgical History:   Procedure Laterality Date   • FRACTURE SURGERY Left     Elbow       Family History   Problem Relation Age of Onset   • Hypertension Mother    • Cancer Father         sarcoma in neck   • Hypertension Sister    • Migraines Sister    • Anxiety disorder Sister    • Heart attack Paternal Uncle    • Heart attack Maternal Grandfather        Social History     Socioeconomic History   • Marital status:    Tobacco Use   • Smoking status: Never   • Smokeless tobacco: Never   Substance and Sexual Activity   • Alcohol use: Yes     Comment: 10 drinks on the weekend.           The following portions of the patient's history were reviewed and updated as appropriate: problem list, allergies, current medications, past medical history, past family history, past social history and past surgical  "history.    Review of Systems    Immunization History   Administered Date(s) Administered   • COVID-19 (PFIZER) PURPLE CAP 03/11/2021, 04/03/2021, 12/08/2021   • Flu Vaccine Quad PF >36MO 10/03/2022   • FluLaval/Fluzone >6mos 09/21/2020, 09/21/2021   • Tdap 08/20/2020       Objective   Vitals:    10/14/22 0828   BP: 128/86   Temp: 98 °F (36.7 °C)   Weight: 66.2 kg (146 lb)   Height: 165.1 cm (65\")     Body mass index is 24.3 kg/m².  Physical Exam  Vitals reviewed.   Constitutional:       Appearance: She is well-developed.   HENT:      Head: Normocephalic and atraumatic.      Mouth/Throat:      Mouth: Mucous membranes are moist.      Pharynx: Oropharynx is clear.   Eyes:      Extraocular Movements: Extraocular movements intact.      Conjunctiva/sclera: Conjunctivae normal.      Pupils: Pupils are equal, round, and reactive to light.   Neck:      Thyroid: No thyromegaly.   Cardiovascular:      Rate and Rhythm: Normal rate and regular rhythm.      Heart sounds: Normal heart sounds. No murmur heard.  Pulmonary:      Effort: Pulmonary effort is normal.      Breath sounds: Normal breath sounds.   Abdominal:      General: There is no distension.      Palpations: Abdomen is soft. There is no mass.      Tenderness: There is no abdominal tenderness. There is no rebound.   Musculoskeletal:      Cervical back: Neck supple.   Lymphadenopathy:      Cervical: No cervical adenopathy.   Skin:     General: Skin is warm.   Neurological:      Mental Status: She is alert.   Psychiatric:         Behavior: Behavior normal.         Procedures    Assessment & Plan   Diagnoses and all orders for this visit:    1. Urinary frequency (Primary)  -     POCT urinalysis dipstick, automated  -     Urine Culture - Urine, Urine, Clean Catch  -     nitrofurantoin, macrocrystal-monohydrate, (Macrobid) 100 MG capsule; Take 1 capsule by mouth 2 (Two) Times a Day.  Dispense: 10 capsule; Refill: 0    2. Wellness examination    3. Essential hypertension  -   "   amLODIPine (NORVASC) 10 MG tablet; Take 1 tablet by mouth Daily.  Dispense: 90 tablet; Refill: 1    4. Acute UTI  -     nitrofurantoin, macrocrystal-monohydrate, (Macrobid) 100 MG capsule; Take 1 capsule by mouth 2 (Two) Times a Day.  Dispense: 10 capsule; Refill: 0    5. Allergy, initial encounter  -     montelukast (Singulair) 10 MG tablet; Take 1 tablet by mouth Daily.  Dispense: 90 tablet; Refill: 3               Reviewed labs. She sees gyn this month for a pap. Discussed exercising 150 minutes per week. Discussed decreasing alcohol on the weekends. BP is good.  Return in about 6 months (around 4/14/2023).

## 2022-10-18 LAB
BACTERIA UR CULT: ABNORMAL
BACTERIA UR CULT: ABNORMAL
OTHER ANTIBIOTIC SUSC ISLT: ABNORMAL

## 2023-04-11 DIAGNOSIS — F51.01 PRIMARY INSOMNIA: ICD-10-CM

## 2023-04-11 DIAGNOSIS — I10 ESSENTIAL HYPERTENSION: Chronic | ICD-10-CM

## 2023-04-11 RX ORDER — AMLODIPINE BESYLATE 10 MG/1
10 TABLET ORAL DAILY
Qty: 90 TABLET | Refills: 1 | Status: SHIPPED | OUTPATIENT
Start: 2023-04-11 | End: 2023-04-21

## 2023-04-11 RX ORDER — TRAZODONE HYDROCHLORIDE 50 MG/1
50 TABLET ORAL NIGHTLY
Qty: 30 TABLET | Refills: 3 | Status: SHIPPED | OUTPATIENT
Start: 2023-04-11

## 2023-04-21 ENCOUNTER — OFFICE VISIT (OUTPATIENT)
Dept: INTERNAL MEDICINE | Facility: CLINIC | Age: 38
End: 2023-04-21
Payer: COMMERCIAL

## 2023-04-21 VITALS
WEIGHT: 149.6 LBS | BODY MASS INDEX: 24.93 KG/M2 | DIASTOLIC BLOOD PRESSURE: 90 MMHG | HEIGHT: 65 IN | SYSTOLIC BLOOD PRESSURE: 120 MMHG

## 2023-04-21 DIAGNOSIS — F41.9 ANXIETY: ICD-10-CM

## 2023-04-21 DIAGNOSIS — I10 ESSENTIAL HYPERTENSION: Primary | Chronic | ICD-10-CM

## 2023-04-21 PROCEDURE — 99214 OFFICE O/P EST MOD 30 MIN: CPT | Performed by: INTERNAL MEDICINE

## 2023-04-21 RX ORDER — LOSARTAN POTASSIUM 50 MG/1
50 TABLET ORAL DAILY
Qty: 30 TABLET | Refills: 1 | Status: SHIPPED | OUTPATIENT
Start: 2023-04-21

## 2023-04-21 RX ORDER — ESCITALOPRAM OXALATE 10 MG/1
10 TABLET ORAL DAILY
Qty: 30 TABLET | Refills: 1 | Status: SHIPPED | OUTPATIENT
Start: 2023-04-21

## 2023-04-21 RX ORDER — AMLODIPINE BESYLATE 5 MG/1
5 TABLET ORAL DAILY
Qty: 30 TABLET | Refills: 2 | Status: SHIPPED | OUTPATIENT
Start: 2023-04-21

## 2023-04-21 NOTE — PROGRESS NOTES
Subjective        Chief Complaint   Patient presents with   • Hypertension           Adriane Schwartz is a 38 y.o. female who presents for    Patient Active Problem List   Diagnosis   • Other headache syndrome   • Essential hypertension   • Anxiety       History of Present Illness     Her Bp has been   ??/80. She denies chest pain or dyspnea. She continues to have edema with Norvasc. Her  is sterile. Trazodone helps her fall asleep but does not keep her asleep. She is feeling anxious. She is not sure of the trigger. She has rare depression. She denies SI. She is not interested in social interaction.  She has noticed more in the last two years. Her dog  this week.   No Known Allergies    Current Outpatient Medications on File Prior to Visit   Medication Sig Dispense Refill   • montelukast (Singulair) 10 MG tablet Take 1 tablet by mouth Daily. 90 tablet 3   • traZODone (DESYREL) 50 MG tablet Take 1 tablet by mouth Every Night. 30 tablet 3   • [DISCONTINUED] amLODIPine (NORVASC) 10 MG tablet Take 1 tablet by mouth Daily. 90 tablet 1   • [DISCONTINUED] nitrofurantoin, macrocrystal-monohydrate, (Macrobid) 100 MG capsule Take 1 capsule by mouth 2 (Two) Times a Day. 10 capsule 0     No current facility-administered medications on file prior to visit.       Past Medical History:   Diagnosis Date   • Acid reflux    • Migraine headache        Past Surgical History:   Procedure Laterality Date   • FRACTURE SURGERY Left     Elbow       Family History   Problem Relation Age of Onset   • Hypertension Mother    • Cancer Father         sarcoma in neck   • Hypertension Sister    • Migraines Sister    • Anxiety disorder Sister    • Heart attack Paternal Uncle    • Heart attack Maternal Grandfather        Social History     Socioeconomic History   • Marital status:    Tobacco Use   • Smoking status: Never   • Smokeless tobacco: Never   Substance and Sexual Activity   • Alcohol use: Yes     Comment: 10 drinks on the  "weekend.           The following portions of the patient's history were reviewed and updated as appropriate: problem list, allergies, current medications, past medical history, past family history, past social history and past surgical history.    Review of Systems    Immunization History   Administered Date(s) Administered   • COVID-19 (PFIZER) PURPLE CAP 03/11/2021, 04/03/2021, 12/08/2021   • Flu Vaccine Quad PF >36MO 10/03/2022   • FluLaval/Fluzone >6mos 09/21/2020, 09/21/2021   • Tdap 08/20/2020       Objective   Vitals:    04/21/23 0751   BP: 120/90   Weight: 67.9 kg (149 lb 9.6 oz)   Height: 165.1 cm (65\")     Body mass index is 24.89 kg/m².  Physical Exam  Vitals reviewed.   Constitutional:       Appearance: She is well-developed.   HENT:      Head: Normocephalic and atraumatic.   Cardiovascular:      Rate and Rhythm: Normal rate and regular rhythm.      Heart sounds: Normal heart sounds, S1 normal and S2 normal.      Comments: No edema on exam  Pulmonary:      Effort: Pulmonary effort is normal.      Breath sounds: Normal breath sounds.   Skin:     General: Skin is warm.   Neurological:      Mental Status: She is alert.   Psychiatric:         Behavior: Behavior normal.         Procedures    Assessment & Plan   Diagnoses and all orders for this visit:    1. Essential hypertension (Primary)  -     amLODIPine (NORVASC) 5 MG tablet; Take 1 tablet by mouth Daily.  Dispense: 30 tablet; Refill: 2  -     losartan (Cozaar) 50 MG tablet; Take 1 tablet by mouth Daily.  Dispense: 30 tablet; Refill: 1  -     Basic Metabolic Panel; Future    2. Anxiety  Comments:  Start lexapro as sister takes. Reviewed side effects. Encouraged 150 minutes exercise per week and decrease etoh on weekends.  Orders:  -     escitalopram (Lexapro) 10 MG tablet; Take 1 tablet by mouth Daily.  Dispense: 30 tablet; Refill: 1             Decrease Norvasc with edema. Start Losartan; discussed risk of birth defects;  is sterile.     Return in " about 4 weeks (around 5/19/2023) for Lab Before FUP.

## 2023-05-11 DIAGNOSIS — I10 ESSENTIAL HYPERTENSION: Chronic | ICD-10-CM

## 2023-05-11 LAB
BUN SERPL-MCNC: 9 MG/DL (ref 6–20)
BUN/CREAT SERPL: 9.6 (ref 7–25)
CALCIUM SERPL-MCNC: 9.5 MG/DL (ref 8.6–10.5)
CHLORIDE SERPL-SCNC: 104 MMOL/L (ref 98–107)
CO2 SERPL-SCNC: 29.3 MMOL/L (ref 22–29)
CREAT SERPL-MCNC: 0.94 MG/DL (ref 0.57–1)
EGFRCR SERPLBLD CKD-EPI 2021: 79.3 ML/MIN/1.73
GLUCOSE SERPL-MCNC: 84 MG/DL (ref 65–99)
POTASSIUM SERPL-SCNC: 4.2 MMOL/L (ref 3.5–5.2)
SODIUM SERPL-SCNC: 140 MMOL/L (ref 136–145)

## 2023-05-19 ENCOUNTER — OFFICE VISIT (OUTPATIENT)
Dept: INTERNAL MEDICINE | Facility: CLINIC | Age: 38
End: 2023-05-19
Payer: COMMERCIAL

## 2023-05-19 VITALS
HEIGHT: 65 IN | BODY MASS INDEX: 24.86 KG/M2 | TEMPERATURE: 97.5 F | WEIGHT: 149.2 LBS | DIASTOLIC BLOOD PRESSURE: 80 MMHG | SYSTOLIC BLOOD PRESSURE: 110 MMHG

## 2023-05-19 DIAGNOSIS — F51.3 SLEEP WALKING: ICD-10-CM

## 2023-05-19 DIAGNOSIS — I10 ESSENTIAL HYPERTENSION: Primary | Chronic | ICD-10-CM

## 2023-05-19 DIAGNOSIS — F41.9 ANXIETY: Chronic | ICD-10-CM

## 2023-05-19 DIAGNOSIS — R06.83 SNORING: ICD-10-CM

## 2023-05-19 DIAGNOSIS — F51.01 PRIMARY INSOMNIA: Chronic | ICD-10-CM

## 2023-05-19 PROCEDURE — 99214 OFFICE O/P EST MOD 30 MIN: CPT | Performed by: INTERNAL MEDICINE

## 2023-05-19 RX ORDER — DOXEPIN HYDROCHLORIDE 3 MG/1
3 TABLET ORAL NIGHTLY PRN
Qty: 30 TABLET | Refills: 2 | Status: SHIPPED | OUTPATIENT
Start: 2023-05-19

## 2023-05-19 RX ORDER — FLUOXETINE HYDROCHLORIDE 20 MG/1
20 CAPSULE ORAL DAILY
Qty: 30 CAPSULE | Refills: 3 | Status: SHIPPED | OUTPATIENT
Start: 2023-05-19

## 2023-05-19 NOTE — PROGRESS NOTES
Subjective        Chief Complaint   Patient presents with   • Hypertension           Adriane Schwartz is a 38 y.o. female who presents for    Patient Active Problem List   Diagnosis   • Other headache syndrome   • Essential hypertension   • Anxiety   • Primary insomnia       History of Present Illness     Her BP was 108/76 at last check. She denies edema, chest pain, dyspnea, or dizziness. She has no side effects with Losartan. She has had nausea and diarrhea with lexapro. She has not seen a change in her anxiety. She is not sleeping any better. She takes Trazodone prn; it does help her fall asleep. She does wake up and falls back asleep. She is in bed 8 hours. She only gets 5-6 hours of sleep. She does snore. She denies apnea. She does not wake up rested. She walks in her sleep sometimes. She fell last time she did it. She does not eat while sleep walking or try to leave the house.  No Known Allergies    Current Outpatient Medications on File Prior to Visit   Medication Sig Dispense Refill   • amLODIPine (NORVASC) 5 MG tablet Take 1 tablet by mouth Daily. 30 tablet 2   • losartan (Cozaar) 50 MG tablet Take 1 tablet by mouth Daily. 30 tablet 1   • montelukast (Singulair) 10 MG tablet Take 1 tablet by mouth Daily. 90 tablet 3   • Probiotic Product (PROBIOTIC PO) Take  by mouth Daily.     • [DISCONTINUED] escitalopram (Lexapro) 10 MG tablet Take 1 tablet by mouth Daily. 30 tablet 1   • [DISCONTINUED] traZODone (DESYREL) 50 MG tablet Take 1 tablet by mouth Every Night. 30 tablet 3     No current facility-administered medications on file prior to visit.       Past Medical History:   Diagnosis Date   • Acid reflux    • Migraine headache        Past Surgical History:   Procedure Laterality Date   • FRACTURE SURGERY Left     Elbow       Family History   Problem Relation Age of Onset   • Hypertension Mother    • Cancer Father         sarcoma in neck   • Hypertension Sister    • Migraines Sister    • Anxiety disorder Sister    •  "Heart attack Paternal Uncle    • Heart attack Maternal Grandfather        Social History     Socioeconomic History   • Marital status:    Tobacco Use   • Smoking status: Never   • Smokeless tobacco: Never   Substance and Sexual Activity   • Alcohol use: Yes     Comment: 10 drinks on the weekend.           The following portions of the patient's history were reviewed and updated as appropriate: problem list, allergies, current medications, past medical history, past family history, past social history and past surgical history.    Review of Systems    Immunization History   Administered Date(s) Administered   • COVID-19 (PFIZER) Purple Cap Monovalent 03/11/2021, 04/03/2021   • Flu Vaccine Quad PF >36MO 10/03/2022   • FluLaval/Fluzone >6mos 09/21/2020, 09/21/2021   • Tdap 08/20/2020       Objective   Vitals:    05/19/23 1353   BP: 110/80   Temp: 97.5 °F (36.4 °C)   Weight: 67.7 kg (149 lb 3.2 oz)   Height: 165.1 cm (65\")     Body mass index is 24.83 kg/m².  Physical Exam  Vitals reviewed.   Constitutional:       Appearance: She is well-developed.   HENT:      Head: Normocephalic and atraumatic.   Cardiovascular:      Rate and Rhythm: Normal rate and regular rhythm.      Heart sounds: Normal heart sounds, S1 normal and S2 normal.   Pulmonary:      Effort: Pulmonary effort is normal.      Breath sounds: Normal breath sounds.   Skin:     General: Skin is warm.   Neurological:      Mental Status: She is alert.   Psychiatric:         Behavior: Behavior normal.         Procedures    Assessment & Plan   Diagnoses and all orders for this visit:    1. Essential hypertension (Primary)  Comments:  BP is good. Not dizzy.     2. Anxiety  -     FLUoxetine (PROzac) 20 MG capsule; Take 1 capsule by mouth Daily.  Dispense: 30 capsule; Refill: 3    3. Primary insomnia  -     Doxepin HCl 3 MG tablet; Take 3 mg by mouth At Night As Needed (insomnia).  Dispense: 30 tablet; Refill: 2    4. Sleep walking  -     Ambulatory Referral to " Sleep Medicine    5. Snoring  -     Ambulatory Referral to Sleep Medicine               Edema resolved with lower norvasc. BMP nl. Change lexapro to Prozac. Change trazodone to doxepin.  Return in about 6 weeks (around 6/30/2023).

## 2023-07-26 DIAGNOSIS — I10 ESSENTIAL HYPERTENSION: Chronic | ICD-10-CM

## 2023-07-26 RX ORDER — AMLODIPINE BESYLATE 5 MG/1
TABLET ORAL
Qty: 30 TABLET | Refills: 2 | Status: SHIPPED | OUTPATIENT
Start: 2023-07-26

## 2023-08-24 ENCOUNTER — OFFICE VISIT (OUTPATIENT)
Dept: SLEEP MEDICINE | Facility: HOSPITAL | Age: 38
End: 2023-08-24
Payer: COMMERCIAL

## 2023-08-24 VITALS
HEART RATE: 62 BPM | OXYGEN SATURATION: 98 % | SYSTOLIC BLOOD PRESSURE: 116 MMHG | WEIGHT: 151 LBS | DIASTOLIC BLOOD PRESSURE: 79 MMHG | BODY MASS INDEX: 25.16 KG/M2 | HEIGHT: 65 IN

## 2023-08-24 DIAGNOSIS — G47.8 NON-RESTORATIVE SLEEP: ICD-10-CM

## 2023-08-24 DIAGNOSIS — R06.83 SNORING: Primary | ICD-10-CM

## 2023-08-24 DIAGNOSIS — I10 ESSENTIAL HYPERTENSION: Chronic | ICD-10-CM

## 2023-08-24 DIAGNOSIS — F51.3 SLEEP WALKING: ICD-10-CM

## 2023-08-24 PROCEDURE — G0463 HOSPITAL OUTPT CLINIC VISIT: HCPCS

## 2023-08-24 RX ORDER — CLONAZEPAM 0.5 MG/1
0.5 TABLET ORAL NIGHTLY
Qty: 30 TABLET | Refills: 5 | Status: SHIPPED | OUTPATIENT
Start: 2023-08-24

## 2023-08-24 NOTE — PROGRESS NOTES
[facility]  [address]  [place] [address]  Phone   Fax       Adriane Schwartz  [id number]   1985  38 y.o.  female      Referring physician/provider and PCP Harrison Lew MD    Type of service: Initial Sleep Medicine Consult.  Date of service: 8/24/2023      Chief Complaint   Patient presents with    Snoring    Fatigue    Non-restorative Sleep    sleep walking       History of present illness;  Thank you for asking to see Adriane Schwartz, 38 y.o.. The patient was seen today on 8/24/2023 at [facility].  The patient presents today with symptoms of snoring, non-restorative sleep.  But her main complaint is sleepwalking.  As a child she had sleepwalking problems and also had enuresis until she was in middle school.  For some time her sleepwalking had resolved but now it is surfacing again.  She does not remember but her  states that she walks and few weeks ago she had a fall.  These episodes occur in the early part of the night.  She does not have any night terrors.  She does not think that she has witnessed apneas but occasionally snores.    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     Patient gives the following sleep history.  Sleep schedule:  Bedtime: 11 PM  Wake time: 8 AM  Normally takes about 1 to 2 hours to fall asleep  Average hours of sleep 6-7  Number of naps per day 0      MEDICAL CONDITIONS (PMH)   Anxiety and depression  Hypertension    Social history:  Do you drive a commercial vehicle:  No   Shift work:  No   Tobacco use:  Yes occasionally e-cigarette  Alcohol use: 7 per week  Caffeinated drinks: 2      Family Hx (parents and siblings) (pertaining to sleep medicine)  No history of sleep apnea  Hypertension  Diabetes mellitus  Thyroid disorder    Medications: reviewed    Review of systems:  Positive symptoms are :  Snoring  Sleepwalking      Physical exam:  CONSTITUTINONAL:  Vitals:    08/24/23 1545   BP:  "116/79   Pulse: 62   SpO2: 98%   Weight: 68.5 kg (151 lb)   Height: 165.1 cm (65\")    Body mass index is 25.13 kg/mý.   NOSE:no nasal septal defects, nasal passages are clear, no nasal polyps,   THROAT: tonsils are at enlarged, tongue normal size, oral airway Mallampati class 2  NECK: , trachea is in the midline, thyroid not enlarged  RESPIRATORY SYSTEM: Breath sounds are normal, there are no wheezes  CARDIOVASULAR SYSTEM: Heart sounds are regular rhythm and normal rate, no edema  NEUROLOGICAL SYSTEM: Oriented x 3, No speech defect, gait is normal  PSYCHIATRIC SYSTEM: Mood is normal, thought content is normal    Office notes from care team reviewed. Office note dated May 19, 2023,reviewed  Labs reviewed.  TSH Results:  TSH          10/7/2022    08:03   TSH   TSH 3.120            Assessment and plan:  Non-REM parasomnia, patient exhibit sleepwalking which is a non-REM parasomnia.  Usually occurs in N 2 and N 3 stages.  I had a long talk with the patient about the phenomena.  Also discussed importance of avoiding any alcohol and sedative medications before bedtime and also have adequate amount of sleep.  Whenever the patient's has sleep deprivation these episodes tend to occur more often.  She has a better understanding of the phenomenon now.  Sometimes sleep apnea can bring on these episodes because of the fragmented sleep.  As a part of the treatment I am going to proceed with a home sleep test to rule out sleep apnea.  Meantime I have also talked to the patient about behavioral modifications including avoiding alcohol and sedative medications and having a good amount of sleep time on a nightly basis.  In addition she can set up an alarm to wake up between 1-1/2 to 2 hours after going to sleep so that she does not get into deeper sleep cycles.  I am going to start her on clonazepam 0.5 mg to be taken about 30 minutes before bedtime.  I talked to the patient about the benzodiazepines but it is a short acting low-dose " should not cause side effects.  I have also talked to the patient about stopping trazodone.    I have also discussed with the patient the following  Sleep hygiene: Maintaining a regular bedtime and wake time, not to watch television or work in bed, limit caffeine-containing beverages before bed time and avoid naps during the day  Adequate amount of sleep.  Generally most people needs about 7 to 8 hours of sleep.    Return in about 3 months (around 11/24/2023) for Next scheduled follow-up..  Patient's questions were answered      I once again thank you for asking me to see this patient in consultation and I have forwarded my opinion and treatment plan.  Please do not hesitate to call me if you have any questions.   8/24/2023  José Miguel Hylton MD  Sleep Medicine  Medical Director  [facility]: [facility]

## 2023-08-30 DIAGNOSIS — I10 ESSENTIAL HYPERTENSION: Chronic | ICD-10-CM

## 2023-08-30 RX ORDER — LOSARTAN POTASSIUM 50 MG/1
TABLET ORAL
Qty: 30 TABLET | Refills: 1 | Status: SHIPPED | OUTPATIENT
Start: 2023-08-30

## 2023-09-12 ENCOUNTER — HOSPITAL ENCOUNTER (OUTPATIENT)
Dept: SLEEP MEDICINE | Facility: HOSPITAL | Age: 38
Discharge: HOME | End: 2023-09-12
Admitting: INTERNAL MEDICINE
Payer: COMMERCIAL

## 2023-09-12 DIAGNOSIS — G47.8 NON-RESTORATIVE SLEEP: ICD-10-CM

## 2023-09-12 DIAGNOSIS — R06.83 SNORING: ICD-10-CM

## 2023-09-12 DIAGNOSIS — I10 ESSENTIAL HYPERTENSION: Chronic | ICD-10-CM

## 2023-09-12 DIAGNOSIS — F51.3 SLEEP WALKING: ICD-10-CM

## 2023-09-12 PROCEDURE — 95806 SLEEP STUDY UNATT&RESP EFFT: CPT

## 2023-09-27 DIAGNOSIS — F41.9 ANXIETY: Chronic | ICD-10-CM

## 2023-09-27 RX ORDER — FLUOXETINE HYDROCHLORIDE 20 MG/1
20 CAPSULE ORAL DAILY
Qty: 30 CAPSULE | Refills: 3 | Status: SHIPPED | OUTPATIENT
Start: 2023-09-27

## 2023-11-01 DIAGNOSIS — I10 ESSENTIAL HYPERTENSION: Chronic | ICD-10-CM

## 2023-11-01 DIAGNOSIS — T78.40XA ALLERGY, INITIAL ENCOUNTER: ICD-10-CM

## 2023-11-01 RX ORDER — MONTELUKAST SODIUM 10 MG/1
10 TABLET ORAL DAILY
Qty: 90 TABLET | Refills: 3 | Status: SHIPPED | OUTPATIENT
Start: 2023-11-01

## 2023-11-01 RX ORDER — AMLODIPINE BESYLATE 5 MG/1
5 TABLET ORAL DAILY
Qty: 90 TABLET | Refills: 3 | Status: SHIPPED | OUTPATIENT
Start: 2023-11-01

## 2023-11-09 DIAGNOSIS — I10 ESSENTIAL HYPERTENSION: Chronic | ICD-10-CM

## 2023-11-09 RX ORDER — LOSARTAN POTASSIUM 50 MG/1
50 TABLET ORAL DAILY
Qty: 30 TABLET | Refills: 1 | Status: SHIPPED | OUTPATIENT
Start: 2023-11-09

## 2023-11-30 ENCOUNTER — OFFICE VISIT (OUTPATIENT)
Dept: SLEEP MEDICINE | Facility: HOSPITAL | Age: 38
End: 2023-11-30
Payer: COMMERCIAL

## 2023-11-30 VITALS
HEART RATE: 69 BPM | OXYGEN SATURATION: 99 % | HEIGHT: 65 IN | SYSTOLIC BLOOD PRESSURE: 108 MMHG | WEIGHT: 146.4 LBS | DIASTOLIC BLOOD PRESSURE: 73 MMHG | BODY MASS INDEX: 24.39 KG/M2

## 2023-11-30 DIAGNOSIS — F51.3 SLEEP WALKING: Primary | ICD-10-CM

## 2023-11-30 PROBLEM — R06.83 SNORING: Status: RESOLVED | Noted: 2023-08-24 | Resolved: 2023-11-30

## 2023-11-30 PROBLEM — G47.8 NON-RESTORATIVE SLEEP: Status: RESOLVED | Noted: 2023-08-24 | Resolved: 2023-11-30

## 2023-11-30 PROBLEM — F51.01 PRIMARY INSOMNIA: Chronic | Status: RESOLVED | Noted: 2023-05-19 | Resolved: 2023-11-30

## 2023-11-30 PROCEDURE — G0463 HOSPITAL OUTPT CLINIC VISIT: HCPCS

## 2023-11-30 RX ORDER — CLONAZEPAM 0.5 MG/1
0.5 TABLET ORAL NIGHTLY
Qty: 30 TABLET | Refills: 5 | Status: SHIPPED | OUTPATIENT
Start: 2023-11-30

## 2023-11-30 NOTE — PROGRESS NOTES
"  [facility]  [address]  [address]  [place] [address]  Phone   Fax       SLEEP CLINIC FOLLOW UP PROGRESS NOTE.    Adriane Schwartz  [id number]   1985  38 y.o.  female      PCP: Harrison Lew MD      Date of visit: 11/30/2023    Chief Complaint   Patient presents with    Sleeping Problem    Snoring       HPI:  This is a 38 y.o. years old patient is here for the management of sleep walking.  She underwent a home sleep test which did not show any evidence of sleep apnea she had moderate snoring.  Patient is here to discuss the test results I will also get the prescription renewed for her clonazepam.  She was started on clonazepam 0.5 mg and she says that has tremendously helped her and has no problems.    She works for [employer]  Bedtime 10 PM  Wake time 7:30 AM    Medications and allergies are reviewed by me and documented in the encounter.     SOCIAL (habits pertaining to sleep medicine)  History tobacco use:Yes   History of alcohol use: 4 per week  Caffeine use: 2     REVIEW OF SYSTEMS:   Pertaining positive symptoms are:  Convoy Sleepiness Scale :Total score: 5         PHYSICAL EXAMINATION:  CONSTITUTIONAL:  Vitals:    11/30/23 1429   BP: 108/73   Pulse: 69   SpO2: 99%   Weight: 66.4 kg (146 lb 6.4 oz)   Height: 165.1 cm (65\")    Body mass index is 24.36 kg/m².   NOSE: nasal passages are clear, No deformities noted   RESP SYSTEM: Not in any respiratory distress, no chest deformities noted,   CARDIOVASULAR: No edema noted  NEURO: Oriented x 3, gait normal,  Mood and affect appeared appropriate      Jamaal checked    ASSESSMENT AND PLAN:  Sleepwalking.  She is on clonazepam 0.5 mg at bedtime which has helped her tremendously.  I checked the Jamaal and has no problems I am going to renew her prescription for 6 months.  At the end of 6 months she is going to call for another refill and see me in 1 year  Snoring.  A home sleep test did not show any " evidence of sleep apnea..  I discussed the test results and advised that she can use oral appliance for snoring.  Return in about 1 year (around 11/30/2024) for with smart card down load. . Patient's questions were answered.    11/30/2023  José Miguel Hylton MD  Sleep Medicine.  Medical Director,   [facility] [place] [facility].

## 2024-01-08 ENCOUNTER — OFFICE VISIT (OUTPATIENT)
Dept: INTERNAL MEDICINE | Facility: CLINIC | Age: 39
End: 2024-01-08
Payer: COMMERCIAL

## 2024-01-08 VITALS
DIASTOLIC BLOOD PRESSURE: 84 MMHG | HEIGHT: 65 IN | SYSTOLIC BLOOD PRESSURE: 126 MMHG | BODY MASS INDEX: 23.96 KG/M2 | WEIGHT: 143.8 LBS

## 2024-01-08 DIAGNOSIS — I10 ESSENTIAL HYPERTENSION: Chronic | ICD-10-CM

## 2024-01-08 DIAGNOSIS — K92.1 BLOOD IN STOOL: ICD-10-CM

## 2024-01-08 DIAGNOSIS — Z00.00 WELLNESS EXAMINATION: Primary | ICD-10-CM

## 2024-01-08 DIAGNOSIS — F41.9 ANXIETY: Chronic | ICD-10-CM

## 2024-01-08 DIAGNOSIS — R25.2 CRAMPING OF HANDS: ICD-10-CM

## 2024-01-08 PROCEDURE — 99395 PREV VISIT EST AGE 18-39: CPT | Performed by: INTERNAL MEDICINE

## 2024-01-08 NOTE — PROGRESS NOTES
Subjective        Chief Complaint   Patient presents with    Annual Exam           Adriane Schwartz is a 38 y.o. female who presents for    Patient Active Problem List   Diagnosis    Other headache syndrome    Essential hypertension    Anxiety    Sleep walking       History of Present Illness     She had an episode last night where she developed abdominal cramps. She had diarrhea and then she got sweaty. She had diarrhea again this am. She saw some blood in the toilet this am. Her hands then cramped. Her  got her some gatorade. It will last 5-10 minutes. It happened a few months ago. She started her period today. She saw gyn in November. She has a MMG set up for May. Her  has not been sick. They ate the same food. Her anxiety is doing well. She has not checked her BP. She is not sweating at night as much.  She thinks this is at least the fourth episode diarrhea/hand cramping. She will usually have BM daily. She will constipation and bloating before her period. She can feel hot and then freezes afterward. She uses marijuana a few times per week. She does not get nausea or vomiting.She has not been traveling.    No Known Allergies    Current Outpatient Medications on File Prior to Visit   Medication Sig Dispense Refill    amLODIPine (NORVASC) 5 MG tablet TAKE 1 TABLET BY MOUTH DAILY 90 tablet 3    clonazePAM (KlonoPIN) 0.5 MG tablet Take 1 tablet by mouth Every Night. 30 tablet 5    FLUoxetine (PROzac) 20 MG capsule TAKE 1 CAPSULE BY MOUTH DAILY 30 capsule 3    losartan (COZAAR) 50 MG tablet Take 1 tablet by mouth Daily. 30 tablet 1    montelukast (SINGULAIR) 10 MG tablet TAKE ONE TABLET BY MOUTH DAILY 90 tablet 3    Probiotic Product (PROBIOTIC PO) Take  by mouth Daily.       No current facility-administered medications on file prior to visit.       Past Medical History:   Diagnosis Date    Acid reflux     Migraine headache        Past Surgical History:   Procedure Laterality Date    FRACTURE SURGERY Left   "   Elbow       Family History   Problem Relation Age of Onset    Hypertension Mother     Cancer Father         sarcoma in neck    Hypertension Sister     Migraines Sister     Anxiety disorder Sister     Breast cancer Sister     Heart attack Paternal Uncle     Heart attack Maternal Grandfather        Social History     Socioeconomic History    Marital status:    Tobacco Use    Smoking status: Never    Smokeless tobacco: Never   Vaping Use    Vaping Use: Some days    Substances: Nicotine   Substance and Sexual Activity    Alcohol use: Yes     Comment: 10 drinks on the weekend.    Drug use: Yes     Types: Marijuana    Sexual activity: Yes     Partners: Male           The following portions of the patient's history were reviewed and updated as appropriate: problem list, allergies, current medications, past medical history, past family history, past social history, and past surgical history.    Review of Systems    Immunization History   Administered Date(s) Administered    COVID-19 (PFIZER) Purple Cap Monovalent 03/11/2021, 04/03/2021, 12/08/2021    Flu Vaccine Quad PF >36MO 10/03/2022    Fluzone (or Fluarix & Flulaval for VFC) >6mos 09/21/2020, 09/21/2021    Tdap 08/20/2020       Objective   Vitals:    01/08/24 1602   BP: 126/84   Weight: 65.2 kg (143 lb 12.8 oz)   Height: 165.1 cm (65\")     Body mass index is 23.93 kg/m².  Physical Exam  Vitals reviewed.   Constitutional:       Appearance: She is well-developed.   HENT:      Head: Normocephalic and atraumatic.      Mouth/Throat:      Mouth: Mucous membranes are moist.      Pharynx: Oropharynx is clear.   Eyes:      Extraocular Movements: Extraocular movements intact.      Conjunctiva/sclera: Conjunctivae normal.      Pupils: Pupils are equal, round, and reactive to light.   Neck:      Thyroid: No thyromegaly.   Cardiovascular:      Rate and Rhythm: Normal rate and regular rhythm.      Heart sounds: Normal heart sounds. No murmur heard.  Pulmonary:      Effort: " Pulmonary effort is normal.      Breath sounds: Normal breath sounds.   Abdominal:      General: There is no distension.      Palpations: Abdomen is soft. There is no mass.      Tenderness: There is no abdominal tenderness. There is no rebound.   Musculoskeletal:      Cervical back: Neck supple.   Lymphadenopathy:      Cervical: No cervical adenopathy.   Skin:     General: Skin is warm.   Neurological:      Mental Status: She is alert.   Psychiatric:         Behavior: Behavior normal.         Procedures    Assessment & Plan   Diagnoses and all orders for this visit:    1. Wellness examination (Primary)    2. Essential hypertension  Comments:  BP is good    3. Anxiety  Comments:  stable    4. Blood in stool  -     Ambulatory Referral to Gastroenterology    5. Cramping of hands        Recc flu shot at drug store and exercise 150 minutes per week. She has a MMG set up. She will get cmp, flp, tsh, and cbc next week. Get into GI to see what they think of her diarrhea and with seeing blood in stool.           Return in about 6 months (around 7/8/2024).

## 2024-01-22 DIAGNOSIS — I10 ESSENTIAL HYPERTENSION: Chronic | ICD-10-CM

## 2024-01-23 RX ORDER — LOSARTAN POTASSIUM 50 MG/1
50 TABLET ORAL DAILY
Qty: 30 TABLET | Refills: 4 | Status: SHIPPED | OUTPATIENT
Start: 2024-01-23

## 2024-01-31 DIAGNOSIS — F41.9 ANXIETY: Chronic | ICD-10-CM

## 2024-01-31 RX ORDER — FLUOXETINE HYDROCHLORIDE 20 MG/1
20 CAPSULE ORAL DAILY
Qty: 30 CAPSULE | Refills: 5 | Status: SHIPPED | OUTPATIENT
Start: 2024-01-31

## 2024-02-05 ENCOUNTER — PREP FOR SURGERY (OUTPATIENT)
Dept: SURGERY | Facility: SURGERY CENTER | Age: 39
End: 2024-02-05
Payer: COMMERCIAL

## 2024-02-05 ENCOUNTER — OFFICE VISIT (OUTPATIENT)
Dept: GASTROENTEROLOGY | Facility: CLINIC | Age: 39
End: 2024-02-05
Payer: COMMERCIAL

## 2024-02-05 VITALS
OXYGEN SATURATION: 99 % | TEMPERATURE: 97.7 F | WEIGHT: 144.5 LBS | HEIGHT: 65 IN | SYSTOLIC BLOOD PRESSURE: 98 MMHG | HEART RATE: 71 BPM | BODY MASS INDEX: 24.07 KG/M2 | DIASTOLIC BLOOD PRESSURE: 68 MMHG

## 2024-02-05 DIAGNOSIS — R19.8 ALTERNATING CONSTIPATION AND DIARRHEA: ICD-10-CM

## 2024-02-05 DIAGNOSIS — R10.9 ABDOMINAL CRAMPING: ICD-10-CM

## 2024-02-05 DIAGNOSIS — R19.7 DIARRHEA, UNSPECIFIED TYPE: Primary | ICD-10-CM

## 2024-02-05 DIAGNOSIS — K62.5 RECTAL BLEEDING: ICD-10-CM

## 2024-02-05 PROCEDURE — 99203 OFFICE O/P NEW LOW 30 MIN: CPT | Performed by: NURSE PRACTITIONER

## 2024-02-05 RX ORDER — SODIUM CHLORIDE, SODIUM LACTATE, POTASSIUM CHLORIDE, CALCIUM CHLORIDE 600; 310; 30; 20 MG/100ML; MG/100ML; MG/100ML; MG/100ML
30 INJECTION, SOLUTION INTRAVENOUS CONTINUOUS PRN
OUTPATIENT
Start: 2024-02-05

## 2024-02-05 RX ORDER — SODIUM CHLORIDE 0.9 % (FLUSH) 0.9 %
3 SYRINGE (ML) INJECTION EVERY 12 HOURS SCHEDULED
OUTPATIENT
Start: 2024-02-05

## 2024-02-05 RX ORDER — SODIUM CHLORIDE 0.9 % (FLUSH) 0.9 %
10 SYRINGE (ML) INJECTION AS NEEDED
OUTPATIENT
Start: 2024-02-05

## 2024-02-05 NOTE — H&P (VIEW-ONLY)
"Chief Complaint   Patient presents with    Abdominal Pain    Diarrhea    Rectal Bleeding         History of Present Illness  Patient is a 38-year-old female who presents today for evaluation, referred for blood in the stool.    Patient reports a longstanding history of alternating bowel pattern.  Reports a constipation predominance but will have episodes of diarrhea.    Over the last few years she has been experiencing episodes of abdominal cramping followed by diarrhea.  Diarrhea can last for up to 24 hours.  When she has the episodes of cramping and diarrhea she will develop initially tingling in her hands and then cramping in her hands.  She drinks plenty of fluid and Gatorade and it helps with the cramping.    With her most recent diarrhea episode she noted blood in the stool.  It was described as being bright red and mixed in the stool and also dripping from her anal area.    She reports a family history of diverticulitis in her mother, grandmother, and sister.  Her mother has also had precancerous colon polyps.    She started a probiotic which has helped her regularity and is usually having a daily bowel movement at this time.     Result Review :       Comprehensive Metabolic Panel (01/10/2024 08:25)    CBC & Differential (01/10/2024 08:25)    Office Visit with Harrison Lew MD (01/08/2024)    Referral to Gastroenterology for Blood in stool (01/08/2024)    Vital Signs:   BP 98/68   Pulse 71   Temp 97.7 °F (36.5 °C)   Ht 165.1 cm (65\")   Wt 65.5 kg (144 lb 8 oz)   SpO2 99%   BMI 24.05 kg/m²     Body mass index is 24.05 kg/m².     Physical Exam  Vitals reviewed.   Constitutional:       General: She is not in acute distress.     Appearance: She is well-developed.   HENT:      Head: Normocephalic and atraumatic.   Pulmonary:      Effort: Pulmonary effort is normal. No respiratory distress.   Abdominal:      General: Abdomen is flat. Bowel sounds are normal. There is no distension.      Palpations: " Abdomen is soft.      Tenderness: There is no abdominal tenderness.   Skin:     General: Skin is dry.      Coloration: Skin is not pale.   Neurological:      Mental Status: She is alert and oriented to person, place, and time.   Psychiatric:         Thought Content: Thought content normal.           Assessment and Plan    Diagnoses and all orders for this visit:    1. Diarrhea, unspecified type (Primary)  -     Celiac Disease Panel    2. Abdominal cramping    3. Rectal bleeding    4. Alternating constipation and diarrhea         Discussion  Patient presents today for evaluation with concerns about diarrhea, cramping, and blood in the stool.  Recommended colonoscopy for further evaluation and to evaluate for source of bleeding and to assess for any evidence of colitis that could be contributing to her diarrhea episodes.  We will also check a celiac panel today.  Symptoms likely secondary to irritable bowel syndrome.  Recommended trial of daily fiber supplement to help with bowel regularity.          Follow Up   Return for Follow up to review results after testing complete.    Patient Instructions   Schedule colonoscopy for further evaluation.     Check celiac panel today.    Recommend starting a daily fiber supplement such as Citrucel, Metamucil, or FiberCon available over-the-counter.

## 2024-02-05 NOTE — PATIENT INSTRUCTIONS
Schedule colonoscopy for further evaluation.     Check celiac panel today.    Recommend starting a daily fiber supplement such as Citrucel, Metamucil, or FiberCon available over-the-counter.

## 2024-02-07 ENCOUNTER — PREP FOR SURGERY (OUTPATIENT)
Dept: SURGERY | Facility: SURGERY CENTER | Age: 39
End: 2024-02-07
Payer: COMMERCIAL

## 2024-02-07 DIAGNOSIS — R19.7 DIARRHEA, UNSPECIFIED TYPE: Primary | ICD-10-CM

## 2024-02-07 DIAGNOSIS — R10.9 ABDOMINAL CRAMPING: ICD-10-CM

## 2024-02-07 DIAGNOSIS — K62.5 RECTAL BLEEDING: ICD-10-CM

## 2024-02-07 LAB
ENDOMYSIUM IGA SER QL: NEGATIVE
IGA SERPL-MCNC: 381 MG/DL (ref 87–352)
TTG IGA SER-ACNC: <2 U/ML (ref 0–3)

## 2024-02-07 RX ORDER — SODIUM CHLORIDE 0.9 % (FLUSH) 0.9 %
3 SYRINGE (ML) INJECTION EVERY 12 HOURS SCHEDULED
OUTPATIENT
Start: 2024-02-07

## 2024-02-07 RX ORDER — SODIUM CHLORIDE, SODIUM LACTATE, POTASSIUM CHLORIDE, CALCIUM CHLORIDE 600; 310; 30; 20 MG/100ML; MG/100ML; MG/100ML; MG/100ML
30 INJECTION, SOLUTION INTRAVENOUS CONTINUOUS PRN
OUTPATIENT
Start: 2024-02-07

## 2024-02-07 RX ORDER — SODIUM CHLORIDE 0.9 % (FLUSH) 0.9 %
10 SYRINGE (ML) INJECTION AS NEEDED
OUTPATIENT
Start: 2024-02-07

## 2024-02-22 ENCOUNTER — ANESTHESIA (OUTPATIENT)
Dept: SURGERY | Facility: SURGERY CENTER | Age: 39
End: 2024-02-22
Payer: COMMERCIAL

## 2024-02-22 ENCOUNTER — HOSPITAL ENCOUNTER (OUTPATIENT)
Facility: SURGERY CENTER | Age: 39
Setting detail: HOSPITAL OUTPATIENT SURGERY
Discharge: HOME | End: 2024-02-22
Attending: INTERNAL MEDICINE | Admitting: INTERNAL MEDICINE
Payer: COMMERCIAL

## 2024-02-22 ENCOUNTER — ANESTHESIA EVENT (OUTPATIENT)
Dept: SURGERY | Facility: SURGERY CENTER | Age: 39
End: 2024-02-22
Payer: COMMERCIAL

## 2024-02-22 VITALS
OXYGEN SATURATION: 97 % | TEMPERATURE: 98.4 F | HEART RATE: 72 BPM | BODY MASS INDEX: 23.86 KG/M2 | RESPIRATION RATE: 16 BRPM | HEIGHT: 65 IN | SYSTOLIC BLOOD PRESSURE: 115 MMHG | DIASTOLIC BLOOD PRESSURE: 82 MMHG | WEIGHT: 143.2 LBS

## 2024-02-22 DIAGNOSIS — R19.7 DIARRHEA, UNSPECIFIED TYPE: ICD-10-CM

## 2024-02-22 DIAGNOSIS — K62.5 RECTAL BLEEDING: ICD-10-CM

## 2024-02-22 DIAGNOSIS — R10.9 ABDOMINAL CRAMPING: ICD-10-CM

## 2024-02-22 LAB
B-HCG UR QL: NEGATIVE
EXPIRATION DATE: NORMAL
INTERNAL NEGATIVE CONTROL: NEGATIVE
INTERNAL POSITIVE CONTROL: POSITIVE
Lab: NORMAL

## 2024-02-22 PROCEDURE — 45380 COLONOSCOPY AND BIOPSY: CPT | Performed by: INTERNAL MEDICINE

## 2024-02-22 PROCEDURE — 88305 TISSUE EXAM BY PATHOLOGIST: CPT | Performed by: INTERNAL MEDICINE

## 2024-02-22 PROCEDURE — 81025 URINE PREGNANCY TEST: CPT | Performed by: NURSE PRACTITIONER

## 2024-02-22 PROCEDURE — 45385 COLONOSCOPY W/LESION REMOVAL: CPT | Performed by: INTERNAL MEDICINE

## 2024-02-22 PROCEDURE — 25010000002 PROPOFOL 10 MG/ML EMULSION: Performed by: REGISTERED NURSE

## 2024-02-22 PROCEDURE — 25810000003 LACTATED RINGERS PER 1000 ML: Performed by: NURSE PRACTITIONER

## 2024-02-22 RX ORDER — SODIUM CHLORIDE 0.9 % (FLUSH) 0.9 %
10 SYRINGE (ML) INJECTION AS NEEDED
Status: DISCONTINUED | OUTPATIENT
Start: 2024-02-22 | End: 2024-02-22 | Stop reason: HOSPADM

## 2024-02-22 RX ORDER — SODIUM CHLORIDE 0.9 % (FLUSH) 0.9 %
3 SYRINGE (ML) INJECTION EVERY 12 HOURS SCHEDULED
Status: DISCONTINUED | OUTPATIENT
Start: 2024-02-22 | End: 2024-02-22 | Stop reason: HOSPADM

## 2024-02-22 RX ORDER — LIDOCAINE HYDROCHLORIDE 20 MG/ML
INJECTION, SOLUTION INFILTRATION; PERINEURAL AS NEEDED
Status: DISCONTINUED | OUTPATIENT
Start: 2024-02-22 | End: 2024-02-22 | Stop reason: SURG

## 2024-02-22 RX ORDER — FENTANYL CITRATE 50 UG/ML
25 INJECTION, SOLUTION INTRAMUSCULAR; INTRAVENOUS
Status: DISCONTINUED | OUTPATIENT
Start: 2024-02-22 | End: 2024-02-22 | Stop reason: HOSPADM

## 2024-02-22 RX ORDER — LABETALOL HYDROCHLORIDE 5 MG/ML
5 INJECTION, SOLUTION INTRAVENOUS
Status: DISCONTINUED | OUTPATIENT
Start: 2024-02-22 | End: 2024-02-22 | Stop reason: HOSPADM

## 2024-02-22 RX ORDER — PROPOFOL 10 MG/ML
VIAL (ML) INTRAVENOUS AS NEEDED
Status: DISCONTINUED | OUTPATIENT
Start: 2024-02-22 | End: 2024-02-22 | Stop reason: SURG

## 2024-02-22 RX ORDER — ONDANSETRON 2 MG/ML
4 INJECTION INTRAMUSCULAR; INTRAVENOUS ONCE AS NEEDED
Status: DISCONTINUED | OUTPATIENT
Start: 2024-02-22 | End: 2024-02-22 | Stop reason: HOSPADM

## 2024-02-22 RX ORDER — SODIUM CHLORIDE, SODIUM LACTATE, POTASSIUM CHLORIDE, CALCIUM CHLORIDE 600; 310; 30; 20 MG/100ML; MG/100ML; MG/100ML; MG/100ML
30 INJECTION, SOLUTION INTRAVENOUS CONTINUOUS PRN
Status: DISCONTINUED | OUTPATIENT
Start: 2024-02-22 | End: 2024-02-22 | Stop reason: HOSPADM

## 2024-02-22 RX ORDER — HYDRALAZINE HYDROCHLORIDE 20 MG/ML
10 INJECTION INTRAMUSCULAR; INTRAVENOUS
Status: DISCONTINUED | OUTPATIENT
Start: 2024-02-22 | End: 2024-02-22 | Stop reason: HOSPADM

## 2024-02-22 RX ADMIN — SODIUM CHLORIDE, POTASSIUM CHLORIDE, SODIUM LACTATE AND CALCIUM CHLORIDE 30 ML/HR: 600; 310; 30; 20 INJECTION, SOLUTION INTRAVENOUS at 07:22

## 2024-02-22 RX ADMIN — PROPOFOL 50 MG: 10 INJECTION, EMULSION INTRAVENOUS at 07:51

## 2024-02-22 RX ADMIN — LIDOCAINE HYDROCHLORIDE 60 MG: 20 INJECTION, SOLUTION INFILTRATION; PERINEURAL at 07:51

## 2024-02-22 RX ADMIN — PROPOFOL 200 MCG/KG/MIN: 10 INJECTION, EMULSION INTRAVENOUS at 07:51

## 2024-02-22 NOTE — ANESTHESIA POSTPROCEDURE EVALUATION
"Patient: Adriane Schwartz    Procedure Summary       Date: 02/22/24 Room / Location: [facility]    Anesthesia Start: 0748 Anesthesia Stop: 0813    Procedure: COLONOSCOPY TO CECUM Diagnosis:       Diarrhea, unspecified type      Abdominal cramping      Rectal bleeding      (Diarrhea, unspecified type [R19.7])      (Abdominal cramping [R10.9])      (Rectal bleeding [K62.5])    Surgeons: Ruslan George MD Provider: Julio Riggs MD    Anesthesia Type: MAC ASA Status: 2            Anesthesia Type: MAC    Vitals  Vitals Value Taken Time   /82 02/22/24 0831   Temp 36.9 °C (98.4 °F) 02/22/24 0811   Pulse 72 02/22/24 0831   Resp 16 02/22/24 0831   SpO2 97 % 02/22/24 0831           Post Anesthesia Care and Evaluation    Patient location during evaluation: bedside  Level of consciousness: awake  Pain management: adequate    Airway patency: patent  Anesthetic complications: No anesthetic complications    Cardiovascular status: acceptable  Respiratory status: acceptable  Hydration status: acceptable    Comments: */82 (BP Location: Left arm, Patient Position: Lying)   Pulse 72   Temp 36.9 °C (98.4 °F) (Infrared)   Resp 16   Ht 165.1 cm (65\")   Wt 65 kg (143 lb 3.2 oz)   LMP 02/05/2024   SpO2 97%   BMI 23.83 kg/m²       "

## 2024-02-22 NOTE — ANESTHESIA PREPROCEDURE EVALUATION
Anesthesia Evaluation     no history of anesthetic complications:   NPO Solid Status: > 8 hours  NPO Liquid Status: > 2 hours           Airway   Mallampati: I  Neck ROM: full  no difficulty expected  Dental - normal exam     Pulmonary - negative pulmonary ROS and normal exam   (-) COPD, asthma, sleep apnea, not a smoker    PE comment: nonlabored  Cardiovascular - normal exam  Exercise tolerance: good (4-7 METS)    Rhythm: regular  Rate: normal    (+) hypertension  (-) valvular problems/murmurs, past MI, CAD, dysrhythmias, angina      Neuro/Psych  (+) headaches, psychiatric history Anxiety  (-) seizures, TIA, CVA  GI/Hepatic/Renal/Endo    (+) GERD, GI bleeding   (-) liver disease, no renal disease, diabetes, no thyroid disorder    Musculoskeletal (-) negative ROS    Abdominal    Substance History      OB/GYN          Other                    Anesthesia Plan    ASA 2     MAC       Anesthetic plan, risks, benefits, and alternatives have been provided, discussed and informed consent has been obtained with: patient.    CODE STATUS:

## 2024-02-23 LAB
LAB AP CASE REPORT: NORMAL
LAB AP CLINICAL INFORMATION: NORMAL
PATH REPORT.FINAL DX SPEC: NORMAL
PATH REPORT.GROSS SPEC: NORMAL

## 2024-05-23 DIAGNOSIS — F51.3 SLEEP WALKING: ICD-10-CM

## 2024-05-23 RX ORDER — CLONAZEPAM 0.5 MG/1
0.5 TABLET ORAL NIGHTLY
Qty: 30 TABLET | Refills: 5 | Status: SHIPPED | OUTPATIENT
Start: 2024-05-23

## 2024-05-23 RX ORDER — CLONAZEPAM 0.5 MG/1
0.5 TABLET ORAL NIGHTLY
Qty: 30 TABLET | Refills: 5 | OUTPATIENT
Start: 2024-05-23

## 2024-06-10 DIAGNOSIS — F51.3 SLEEP WALKING: ICD-10-CM

## 2024-06-10 RX ORDER — CLONAZEPAM 0.5 MG/1
0.5 TABLET ORAL NIGHTLY
Qty: 30 TABLET | OUTPATIENT
Start: 2024-06-10

## 2024-06-25 DIAGNOSIS — I10 ESSENTIAL HYPERTENSION: Chronic | ICD-10-CM

## 2024-06-25 RX ORDER — LOSARTAN POTASSIUM 50 MG/1
50 TABLET ORAL DAILY
Qty: 60 TABLET | Refills: 0 | Status: SHIPPED | OUTPATIENT
Start: 2024-06-25

## 2024-06-26 DIAGNOSIS — F51.3 SLEEP WALKING: ICD-10-CM

## 2024-06-26 RX ORDER — CLONAZEPAM 0.5 MG/1
0.5 TABLET ORAL NIGHTLY
Qty: 30 TABLET | Refills: 5 | OUTPATIENT
Start: 2024-06-26

## 2024-07-12 ENCOUNTER — OFFICE VISIT (OUTPATIENT)
Dept: INTERNAL MEDICINE | Facility: CLINIC | Age: 39
End: 2024-07-12
Payer: COMMERCIAL

## 2024-07-12 VITALS
DIASTOLIC BLOOD PRESSURE: 82 MMHG | HEIGHT: 65 IN | SYSTOLIC BLOOD PRESSURE: 122 MMHG | BODY MASS INDEX: 24.46 KG/M2 | WEIGHT: 146.8 LBS

## 2024-07-12 DIAGNOSIS — I10 ESSENTIAL HYPERTENSION: Primary | Chronic | ICD-10-CM

## 2024-07-12 DIAGNOSIS — F41.9 ANXIETY: Chronic | ICD-10-CM

## 2024-07-12 DIAGNOSIS — D12.6 TUBULAR ADENOMA OF COLON: ICD-10-CM

## 2024-07-12 PROCEDURE — 99214 OFFICE O/P EST MOD 30 MIN: CPT | Performed by: INTERNAL MEDICINE

## 2024-07-12 NOTE — PROGRESS NOTES
Subjective        Chief Complaint   Patient presents with    Hypertension           Adriane Schwartz is a 39 y.o. female who presents for    Patient Active Problem List   Diagnosis    Other headache syndrome    Essential hypertension    Anxiety    Sleep walking    Diarrhea    Tubular adenoma of colon       History of Present Illness     She had a cscope with a tubular adenoma. She has not had further rectal bleeding. Her father  earlier this year as result of pneumonia and head and neck CA. She has been checking her BP and it runs 110s/80s. She is not sleeping walking as much. She has not been taking clonazepam nightly; she may take twice per week. Her anxiety is stable. She has decreased her beer intake.     No Known Allergies    Current Outpatient Medications on File Prior to Visit   Medication Sig Dispense Refill    amLODIPine (NORVASC) 5 MG tablet TAKE 1 TABLET BY MOUTH DAILY 90 tablet 3    clonazePAM (KlonoPIN) 0.5 MG tablet Take 1 tablet by mouth Every Night. 30 tablet 5    FLUoxetine (PROzac) 20 MG capsule TAKE 1 CAPSULE BY MOUTH DAILY 30 capsule 5    losartan (COZAAR) 50 MG tablet TAKE 1 TABLET BY MOUTH DAILY 60 tablet 0    montelukast (SINGULAIR) 10 MG tablet TAKE ONE TABLET BY MOUTH DAILY 90 tablet 3    Probiotic Product (PROBIOTIC PO) Take  by mouth Daily.       No current facility-administered medications on file prior to visit.       Past Medical History:   Diagnosis Date    Acid reflux     Migraine headache        Past Surgical History:   Procedure Laterality Date    COLONOSCOPY N/A 2024    Procedure: COLONOSCOPY TO CECUM;  Surgeon: Ruslan George MD;  [place] [facility];  Service: Gastroenterology;  Laterality: N/A;  POLYP, HEMORRHOIDS    FRACTURE SURGERY Left     Elbow       Family History   Problem Relation Age of Onset    Colon polyps Mother     Hypertension Mother     Cancer Father         sarcoma in neck    Hypertension Sister     Migraines Sister     Anxiety disorder Sister      "Breast cancer Sister     Heart attack Maternal Grandfather     Heart attack Paternal Uncle     Colon cancer Neg Hx     Crohn's disease Neg Hx     Irritable bowel syndrome Neg Hx     Ulcerative colitis Neg Hx        Social History     Socioeconomic History    Marital status:    Tobacco Use    Smoking status: Never    Smokeless tobacco: Never   Vaping Use    Vaping status: Some Days    Substances: Nicotine   Substance and Sexual Activity    Alcohol use: Yes     Comment: 10 beers per month    Drug use: Yes     Types: Marijuana    Sexual activity: Yes     Partners: Male           The following portions of the patient's history were reviewed and updated as appropriate: problem list, allergies, current medications, past medical history, past family history, past social history, and past surgical history.    Review of Systems    Immunization History   Administered Date(s) Administered    COVID-19 (PFIZER) Purple Cap Monovalent 03/11/2021, 04/03/2021, 12/08/2021    Flu Vaccine Quad PF >36MO 10/03/2022    Fluzone (or Fluarix & Flulaval for VFC) >6mos 09/21/2020, 09/21/2021    Tdap 08/20/2020       Objective   Vitals:    07/12/24 1432   BP: 122/82   Weight: 66.6 kg (146 lb 12.8 oz)   Height: 165.1 cm (65\")     Body mass index is 24.43 kg/m².  Physical Exam  Vitals reviewed.   Constitutional:       Appearance: She is well-developed.   HENT:      Head: Normocephalic and atraumatic.   Cardiovascular:      Rate and Rhythm: Normal rate and regular rhythm.      Heart sounds: Normal heart sounds, S1 normal and S2 normal.   Pulmonary:      Effort: Pulmonary effort is normal.      Breath sounds: Normal breath sounds.   Skin:     General: Skin is warm.   Neurological:      Mental Status: She is alert.   Psychiatric:         Behavior: Behavior normal.         Procedures    Assessment & Plan   Diagnoses and all orders for this visit:    1. Essential hypertension (Primary)  -     Comprehensive Metabolic Panel; Future  -     Lipid " Panel With / Chol / HDL Ratio; Future    2. Tubular adenoma of colon    3. Anxiety        Repeat cscope in 5 years. Reviewed her genetic component of HTN. Stable emotionally. Discussed exercising 150 minutes per week. She has decreased her etoh.            Return in about 6 months (around 1/12/2025) for Annual physical, Lab Before FUP.

## 2024-08-02 DIAGNOSIS — F41.9 ANXIETY: Chronic | ICD-10-CM

## 2024-08-02 RX ORDER — FLUOXETINE HYDROCHLORIDE 20 MG/1
20 CAPSULE ORAL DAILY
Qty: 90 CAPSULE | Refills: 3 | Status: SHIPPED | OUTPATIENT
Start: 2024-08-02

## 2024-09-04 DIAGNOSIS — I10 ESSENTIAL HYPERTENSION: Chronic | ICD-10-CM

## 2024-09-04 RX ORDER — LOSARTAN POTASSIUM 50 MG/1
50 TABLET ORAL DAILY
Qty: 60 TABLET | Refills: 0 | Status: SHIPPED | OUTPATIENT
Start: 2024-09-04

## 2024-10-22 DIAGNOSIS — T78.40XA ALLERGY, INITIAL ENCOUNTER: ICD-10-CM

## 2024-10-22 DIAGNOSIS — I10 ESSENTIAL HYPERTENSION: Chronic | ICD-10-CM

## 2024-10-22 RX ORDER — AMLODIPINE BESYLATE 5 MG/1
5 TABLET ORAL DAILY
Qty: 90 TABLET | Refills: 3 | Status: SHIPPED | OUTPATIENT
Start: 2024-10-22

## 2024-10-22 RX ORDER — LOSARTAN POTASSIUM 50 MG/1
50 TABLET ORAL DAILY
Qty: 90 TABLET | Refills: 3 | Status: SHIPPED | OUTPATIENT
Start: 2024-10-22

## 2024-10-22 RX ORDER — MONTELUKAST SODIUM 10 MG/1
10 TABLET ORAL DAILY
Qty: 90 TABLET | Refills: 3 | Status: SHIPPED | OUTPATIENT
Start: 2024-10-22

## 2025-02-27 ENCOUNTER — LAB (OUTPATIENT)
Facility: HOSPITAL | Age: 40
End: 2025-02-27
Payer: COMMERCIAL

## 2025-02-27 PROCEDURE — 80061 LIPID PANEL: CPT | Performed by: INTERNAL MEDICINE

## 2025-02-27 PROCEDURE — 80053 COMPREHEN METABOLIC PANEL: CPT | Performed by: INTERNAL MEDICINE

## 2025-03-06 ENCOUNTER — OFFICE VISIT (OUTPATIENT)
Dept: INTERNAL MEDICINE | Facility: CLINIC | Age: 40
End: 2025-03-06
Payer: COMMERCIAL

## 2025-03-06 VITALS
WEIGHT: 166.6 LBS | SYSTOLIC BLOOD PRESSURE: 110 MMHG | HEIGHT: 65 IN | DIASTOLIC BLOOD PRESSURE: 80 MMHG | BODY MASS INDEX: 27.76 KG/M2

## 2025-03-06 DIAGNOSIS — H65.92 LEFT SEROUS OTITIS MEDIA, UNSPECIFIED CHRONICITY: ICD-10-CM

## 2025-03-06 DIAGNOSIS — Z00.00 WELLNESS EXAMINATION: Primary | ICD-10-CM

## 2025-03-06 DIAGNOSIS — F41.9 ANXIETY: Chronic | ICD-10-CM

## 2025-03-06 DIAGNOSIS — I10 ESSENTIAL HYPERTENSION: Chronic | ICD-10-CM

## 2025-03-06 PROCEDURE — 99396 PREV VISIT EST AGE 40-64: CPT | Performed by: INTERNAL MEDICINE

## 2025-03-06 RX ORDER — FLUTICASONE PROPIONATE 50 MCG
2 SPRAY, SUSPENSION (ML) NASAL DAILY
Qty: 16 G | Refills: 1 | Status: SHIPPED | OUTPATIENT
Start: 2025-03-06

## 2025-03-06 RX ORDER — FLUOXETINE HYDROCHLORIDE 40 MG/1
40 CAPSULE ORAL DAILY
Qty: 30 CAPSULE | Refills: 2 | Status: SHIPPED | OUTPATIENT
Start: 2025-03-06

## 2025-03-06 NOTE — PROGRESS NOTES
Subjective        Chief Complaint   Patient presents with    Annual Exam           Adriane Schwartz is a 39 y.o. female who presents for    Patient Active Problem List   Diagnosis    Other headache syndrome    Essential hypertension    Anxiety    Sleep walking    Diarrhea       History of Present Illness       Her BP has been 120s/60-70. Denies chest pain or dyspnea. She feels anxious daily for the last two months. She feels like something is going to happen. She will have to take some deep breaths. Denies depression. She does not feel like doing much. Denies SI.She feels fluid in her left ear for months and it has affected her hearing.  No Known Allergies  BMI is >= 25 and <30. (Overweight) The following options were offered after discussion;: exercise counseling/recommendations   Current Outpatient Medications on File Prior to Visit   Medication Sig Dispense Refill    amLODIPine (NORVASC) 5 MG tablet Take 1 tablet by mouth Daily. 90 tablet 3    losartan (COZAAR) 50 MG tablet Take 1 tablet by mouth Daily. 90 tablet 3    montelukast (SINGULAIR) 10 MG tablet Take 1 tablet by mouth Daily. 90 tablet 3    Probiotic Product (PROBIOTIC PO) Take  by mouth Daily.      [DISCONTINUED] clonazePAM (KlonoPIN) 0.5 MG tablet Take 1 tablet by mouth Every Night. 30 tablet 5    [DISCONTINUED] FLUoxetine (PROzac) 20 MG capsule TAKE 1 CAPSULE BY MOUTH DAILY 90 capsule 3     No current facility-administered medications on file prior to visit.       Past Medical History:   Diagnosis Date    Acid reflux     Migraine headache     Tubular adenoma 02/22/2024       Past Surgical History:   Procedure Laterality Date    COLONOSCOPY N/A 2/22/2024    Procedure: COLONOSCOPY TO CECUM;  Surgeon: Ruslan George MD;  Location: [facility] [place];  Service: Gastroenterology;  Laterality: N/A;  POLYP, HEMORRHOIDS    FRACTURE SURGERY Left     Elbow       Family History   Problem Relation Age of Onset    Colon polyps Mother     Hypertension Mother     Cancer  "Father         SCC in neck    Hypertension Sister     Migraines Sister     Anxiety disorder Sister     Breast cancer Sister     Heart attack Maternal Grandfather     Heart attack Paternal Uncle     Colon cancer Neg Hx     Crohn's disease Neg Hx     Irritable bowel syndrome Neg Hx     Ulcerative colitis Neg Hx        Social History     Socioeconomic History    Marital status:    Tobacco Use    Smoking status: Never    Smokeless tobacco: Never   Vaping Use    Vaping status: Some Days    Substances: Nicotine   Substance and Sexual Activity    Alcohol use: Yes     Comment: 2 glasses of wine weekly    Drug use: Yes     Types: Marijuana    Sexual activity: Yes     Partners: Male           The following portions of the patient's history were reviewed and updated as appropriate: problem list, allergies, current medications, past medical history, past family history, past social history, and past surgical history.    Review of Systems    Immunization History   Administered Date(s) Administered    COVID-19 (PFIZER) Purple Cap Monovalent 03/11/2021, 04/03/2021, 12/08/2021    Flu Vaccine Quad PF >36MO 10/03/2022    Fluzone (or Fluarix & Flulaval for VFC) >6mos 09/21/2020, 09/21/2021    Influenza, Unspecified 10/08/2024    Tdap 08/20/2020       Objective   Vitals:    03/06/25 1541   BP: 110/80   Weight: 75.6 kg (166 lb 9.6 oz)   Height: 165.1 cm (65\")     Body mass index is 27.72 kg/m².  Physical Exam  Vitals reviewed.   Constitutional:       Appearance: She is well-developed.   HENT:      Head: Normocephalic and atraumatic.      Right Ear: Tympanic membrane and ear canal normal.      Left Ear: Ear canal normal.      Ears:      Comments: Clear fluid behind left TM.     Mouth/Throat:      Mouth: Mucous membranes are moist.      Pharynx: Oropharynx is clear.   Eyes:      Extraocular Movements: Extraocular movements intact.      Conjunctiva/sclera: Conjunctivae normal.      Pupils: Pupils are equal, round, and reactive to " light.   Neck:      Thyroid: No thyromegaly.   Cardiovascular:      Rate and Rhythm: Normal rate and regular rhythm.      Heart sounds: Normal heart sounds. No murmur heard.  Pulmonary:      Effort: Pulmonary effort is normal.      Breath sounds: Normal breath sounds.   Abdominal:      General: There is no distension.      Palpations: Abdomen is soft. There is no mass.      Tenderness: There is no abdominal tenderness. There is no rebound.   Musculoskeletal:      Cervical back: Neck supple.   Lymphadenopathy:      Cervical: No cervical adenopathy.   Skin:     General: Skin is warm.   Neurological:      Mental Status: She is alert.   Psychiatric:         Behavior: Behavior normal.         Procedures    Assessment & Plan   Diagnoses and all orders for this visit:    1. Wellness examination (Primary)    2. Essential hypertension  Comments:  BP is good    3. Anxiety  -     FLUoxetine (PROzac) 40 MG capsule; Take 1 capsule by mouth Daily.  Dispense: 30 capsule; Refill: 2    4. Left serous otitis media, unspecified chronicity  Comments:  Try flonase. If not better in a month I will refer to ENT  Orders:  -     fluticasone (FLONASE) 50 MCG/ACT nasal spray; Administer 2 sprays into the nostril(s) as directed by provider Daily.  Dispense: 16 g; Refill: 1               Discussed exercising 150 minutes per week. MMG and cscope are UTD. She sees gyn this month. Labs reviewed. She does eat a lot of junk food; reviewed decreasing fast food. Increase prozac; explained side effects. I will recheck her left ear next month.    Return in about 4 weeks (around 4/3/2025).

## 2025-04-04 ENCOUNTER — OFFICE VISIT (OUTPATIENT)
Dept: INTERNAL MEDICINE | Facility: CLINIC | Age: 40
End: 2025-04-04
Payer: COMMERCIAL

## 2025-04-04 VITALS
HEIGHT: 65 IN | DIASTOLIC BLOOD PRESSURE: 100 MMHG | WEIGHT: 166.2 LBS | SYSTOLIC BLOOD PRESSURE: 132 MMHG | BODY MASS INDEX: 27.69 KG/M2

## 2025-04-04 DIAGNOSIS — F32.0 CURRENT MILD EPISODE OF MAJOR DEPRESSIVE DISORDER, UNSPECIFIED WHETHER RECURRENT: ICD-10-CM

## 2025-04-04 DIAGNOSIS — I10 ESSENTIAL HYPERTENSION: Chronic | ICD-10-CM

## 2025-04-04 DIAGNOSIS — F41.9 ANXIETY: Primary | Chronic | ICD-10-CM

## 2025-04-04 PROBLEM — F32.A DEPRESSION: Status: ACTIVE | Noted: 2025-04-04

## 2025-04-04 PROCEDURE — 99214 OFFICE O/P EST MOD 30 MIN: CPT | Performed by: INTERNAL MEDICINE

## 2025-04-04 NOTE — PROGRESS NOTES
Subjective        Chief Complaint   Patient presents with    Anxiety           Adriane Schwartz is a 39 y.o. female who presents for    Patient Active Problem List   Diagnosis    Other headache syndrome    Essential hypertension    Anxiety    Sleep walking    Diarrhea    Depression       History of Present Illness     She is feeling shaky. This is year anniversary of dad passing. She is feeling anxious. She can dizzy with getting ready for work. She has a harder time focusing. She felt a little manic yesterday. She feels helpless and hopeless. Denies SI. Her sister took sertaline for a while. Denies SI.She is not motivated to do anything. She tried lexapro two years ago. She has constipation and decreased libido with prozac.    No Known Allergies    Current Outpatient Medications on File Prior to Visit   Medication Sig Dispense Refill    amLODIPine (NORVASC) 5 MG tablet Take 1 tablet by mouth Daily. 90 tablet 3    fluticasone (FLONASE) 50 MCG/ACT nasal spray Administer 2 sprays into the nostril(s) as directed by provider Daily. 16 g 1    losartan (COZAAR) 50 MG tablet Take 1 tablet by mouth Daily. 90 tablet 3    montelukast (SINGULAIR) 10 MG tablet Take 1 tablet by mouth Daily. 90 tablet 3    Probiotic Product (PROBIOTIC PO) Take  by mouth Daily.      [DISCONTINUED] FLUoxetine (PROzac) 40 MG capsule Take 1 capsule by mouth Daily. 30 capsule 2     No current facility-administered medications on file prior to visit.       Past Medical History:   Diagnosis Date    Acid reflux     Migraine headache     Tubular adenoma 02/22/2024       Past Surgical History:   Procedure Laterality Date    COLONOSCOPY N/A 2/22/2024    Procedure: COLONOSCOPY TO CECUM;  Surgeon: Ruslan George MD;  [place] [facility] [place]  Service: Gastroenterology;  Laterality: N/A;  POLYP, HEMORRHOIDS    FRACTURE SURGERY Left     Elbow       Family History   Problem Relation Age of Onset    Colon polyps Mother     Hypertension Mother     Cancer Father   "       SCC in neck    Hypertension Sister     Migraines Sister     Anxiety disorder Sister     Breast cancer Sister     Heart attack Maternal Grandfather     Heart attack Paternal Uncle     Colon cancer Neg Hx     Crohn's disease Neg Hx     Irritable bowel syndrome Neg Hx     Ulcerative colitis Neg Hx        Social History     Socioeconomic History    Marital status:    Tobacco Use    Smoking status: Never    Smokeless tobacco: Never   Vaping Use    Vaping status: Some Days    Substances: Nicotine   Substance and Sexual Activity    Alcohol use: Yes     Comment: 2 glasses of wine weekly    Drug use: Yes     Types: Marijuana    Sexual activity: Yes     Partners: Male           The following portions of the patient's history were reviewed and updated as appropriate: problem list, allergies, current medications, past medical history, past family history, past social history, and past surgical history.    Review of Systems    Immunization History   Administered Date(s) Administered    COVID-19 (PFIZER) Purple Cap Monovalent 03/11/2021, 04/03/2021, 12/08/2021    Flu Vaccine Quad PF >36MO 10/03/2022    Fluzone (or Fluarix & Flulaval for VFC) >6mos 09/21/2020, 09/21/2021    Influenza, Unspecified 10/08/2024    Tdap 08/20/2020       Objective   Vitals:    04/04/25 1054   BP: 132/100   Weight: 75.4 kg (166 lb 3.2 oz)   Height: 165.1 cm (65\")     Body mass index is 27.66 kg/m².  Physical Exam  Vitals reviewed.   Constitutional:       Appearance: She is well-developed.   HENT:      Head: Normocephalic and atraumatic.   Cardiovascular:      Rate and Rhythm: Normal rate and regular rhythm.      Heart sounds: Normal heart sounds, S1 normal and S2 normal.   Pulmonary:      Effort: Pulmonary effort is normal.      Breath sounds: Normal breath sounds.   Skin:     General: Skin is warm.   Neurological:      Mental Status: She is alert.   Psychiatric:         Behavior: Behavior normal.         Procedures    Assessment & Plan "   Diagnoses and all orders for this visit:    1. Anxiety (Primary)  -     Vortioxetine HBr (Trintellix) 10 MG tablet tablet; Take 1 tablet by mouth Daily With Breakfast.  Dispense: 30 tablet; Refill: 2    2. Current mild episode of major depressive disorder, unspecified whether recurrent  -     Vortioxetine HBr (Trintellix) 10 MG tablet tablet; Take 1 tablet by mouth Daily With Breakfast.  Dispense: 30 tablet; Refill: 2    3. Essential hypertension                 Stop prozac. Change to trintellix. Samples of 5 mg and 10 mg. Recheck BP in follow up; she will check daily and get me numbers in a week. We also discussed doing Genesight should she not improve; she would like to hold on psychiatry referral at the moment.  Return in about 6 weeks (around 5/16/2025), or 30 minutes.

## (undated) DEVICE — GOWN ,SIRUS,NONREINFORCED 3XL: Brand: MEDLINE

## (undated) DEVICE — ADAPT CLN SCPE ENDO PORPOISE BX/50 DISP

## (undated) DEVICE — Device

## (undated) DEVICE — FLEX ADVANTAGE 1500CC: Brand: FLEX ADVANTAGE

## (undated) DEVICE — KT ORCA ORCAPOD DISP STRL

## (undated) DEVICE — GOWN ISOL W/THUMB UNIV BLU BX/15

## (undated) DEVICE — CANN O2 ETCO2 FITS ALL CONN CO2 SMPL A/ 7IN DISP LF